# Patient Record
Sex: FEMALE | Race: ASIAN | Employment: UNEMPLOYED | URBAN - METROPOLITAN AREA
[De-identification: names, ages, dates, MRNs, and addresses within clinical notes are randomized per-mention and may not be internally consistent; named-entity substitution may affect disease eponyms.]

---

## 2017-03-08 PROBLEM — O34.219 HISTORY OF CESAREAN DELIVERY, CURRENTLY PREGNANT: Status: ACTIVE | Noted: 2017-03-08

## 2017-03-08 PROBLEM — Z34.80 ENCOUNTER FOR SUPERVISION OF OTHER NORMAL PREGNANCY: Status: ACTIVE | Noted: 2017-03-08

## 2017-03-08 PROBLEM — O34.219 HISTORY OF CESAREAN DELIVERY, CURRENTLY PREGNANT (HCC): Status: ACTIVE | Noted: 2017-03-08

## 2017-03-08 PROCEDURE — 87491 CHLMYD TRACH DNA AMP PROBE: CPT | Performed by: OBSTETRICS & GYNECOLOGY

## 2017-03-08 PROCEDURE — 87591 N.GONORRHOEAE DNA AMP PROB: CPT | Performed by: OBSTETRICS & GYNECOLOGY

## 2017-03-08 PROCEDURE — 88175 CYTOPATH C/V AUTO FLUID REDO: CPT | Performed by: OBSTETRICS & GYNECOLOGY

## 2017-04-03 PROCEDURE — 86850 RBC ANTIBODY SCREEN: CPT | Performed by: OBSTETRICS & GYNECOLOGY

## 2017-04-03 PROCEDURE — 36415 COLL VENOUS BLD VENIPUNCTURE: CPT | Performed by: OBSTETRICS & GYNECOLOGY

## 2017-04-03 PROCEDURE — 86901 BLOOD TYPING SEROLOGIC RH(D): CPT | Performed by: OBSTETRICS & GYNECOLOGY

## 2017-04-03 PROCEDURE — 85025 COMPLETE CBC W/AUTO DIFF WBC: CPT | Performed by: OBSTETRICS & GYNECOLOGY

## 2017-04-03 PROCEDURE — 86900 BLOOD TYPING SEROLOGIC ABO: CPT | Performed by: OBSTETRICS & GYNECOLOGY

## 2017-04-03 PROCEDURE — 86762 RUBELLA ANTIBODY: CPT | Performed by: OBSTETRICS & GYNECOLOGY

## 2017-04-03 PROCEDURE — 87389 HIV-1 AG W/HIV-1&-2 AB AG IA: CPT | Performed by: OBSTETRICS & GYNECOLOGY

## 2017-04-03 PROCEDURE — 87340 HEPATITIS B SURFACE AG IA: CPT | Performed by: OBSTETRICS & GYNECOLOGY

## 2017-04-03 PROCEDURE — 87086 URINE CULTURE/COLONY COUNT: CPT | Performed by: OBSTETRICS & GYNECOLOGY

## 2017-04-03 PROCEDURE — 86780 TREPONEMA PALLIDUM: CPT | Performed by: OBSTETRICS & GYNECOLOGY

## 2017-04-13 PROCEDURE — 36415 COLL VENOUS BLD VENIPUNCTURE: CPT | Performed by: OBSTETRICS & GYNECOLOGY

## 2017-04-13 PROCEDURE — 81511 FTL CGEN ABNOR FOUR ANAL: CPT | Performed by: OBSTETRICS & GYNECOLOGY

## 2017-06-01 ENCOUNTER — OFFICE VISIT (OUTPATIENT)
Dept: PERINATAL CARE | Facility: HOSPITAL | Age: 22
End: 2017-06-01
Attending: OBSTETRICS & GYNECOLOGY
Payer: MEDICAID

## 2017-06-01 VITALS
DIASTOLIC BLOOD PRESSURE: 62 MMHG | BODY MASS INDEX: 26 KG/M2 | HEART RATE: 115 BPM | SYSTOLIC BLOOD PRESSURE: 136 MMHG | WEIGHT: 145 LBS

## 2017-06-01 DIAGNOSIS — Z03.73 SUSPECTED FETAL ANOMALY NOT FOUND: Primary | ICD-10-CM

## 2017-06-01 PROCEDURE — 99242 OFF/OP CONSLTJ NEW/EST SF 20: CPT

## 2017-06-01 NOTE — PROGRESS NOTES
Outpatient Maternal-Fetal Medicine Consultation    Dear Dr. Kellen Kim    Thank you for requesting ultrasound evaluation and maternal fetal medicine consultation on your patient [facility].   As you are aware she is a 24year old female  with a [place] on: 06/01/2017 EDC: 09/24/2017 GA by current scan: 23w4d  The calculation of the gestational age by current scan was based on BPD, OFD, HC, TCD, AC, FL and HUM.  ____________________________________________________________________________  Anatomy Scan:  S was notably reassuring. The fetal growth was also appropriate for gestational age. SUSPECT SMALL FOR GESTATIONAL AGE  An outside ultrasound the overall growth is at the 6th percentile.   Today the overall growth is appropriate for gestational age at the

## 2017-06-21 PROCEDURE — 86780 TREPONEMA PALLIDUM: CPT | Performed by: OBSTETRICS & GYNECOLOGY

## 2017-06-21 PROCEDURE — 82950 GLUCOSE TEST: CPT | Performed by: OBSTETRICS & GYNECOLOGY

## 2017-07-12 ENCOUNTER — OFFICE VISIT (OUTPATIENT)
Dept: PHYSICAL THERAPY | Age: 22
End: 2017-07-12
Attending: OBSTETRICS & GYNECOLOGY
Payer: MEDICAID

## 2017-07-12 DIAGNOSIS — O26.893 BACK PAIN AFFECTING PREGNANCY, THIRD TRIMESTER: ICD-10-CM

## 2017-07-12 PROCEDURE — 97110 THERAPEUTIC EXERCISES: CPT

## 2017-07-12 PROCEDURE — 97161 PT EVAL LOW COMPLEX 20 MIN: CPT

## 2017-07-12 NOTE — PROGRESS NOTES
SPINE EVALUATION:   Referring Physician: Dr. Nedra Aceves  Diagnosis:  Lumbar spine pain due to pregnancy.  She is due  9/17/17 Date of Service: 7/12/2017     PATIENT Gerry Cotton is a 24year old y/o female who presents to therapy today with complaints mod  Gastroc-soleus: R mod; L mod     Special tests: NT    Balance: SLS R NT, L NT    Today’s Treatment and Response:  Patient education provided on pt issued HEP for quadraped cat and camel and prayer stretch.  STM to  B piriformis and low back today with you have any questions, please contact me at Dept: [phone]    Sincerely,  Electronically signed by therapist: César Lopez PT    Physician's certification required: Yes  I certify the need for these services furnished under this plan of treatment

## 2017-07-18 ENCOUNTER — APPOINTMENT (OUTPATIENT)
Dept: PHYSICAL THERAPY | Age: 22
End: 2017-07-18
Payer: MEDICAID

## 2017-07-20 ENCOUNTER — APPOINTMENT (OUTPATIENT)
Dept: PHYSICAL THERAPY | Age: 22
End: 2017-07-20
Payer: MEDICAID

## 2017-08-13 ENCOUNTER — HOSPITAL ENCOUNTER (OUTPATIENT)
Facility: HOSPITAL | Age: 22
Setting detail: OBSERVATION
Discharge: HOME | End: 2017-08-13
Attending: OBSTETRICS & GYNECOLOGY | Admitting: OBSTETRICS & GYNECOLOGY
Payer: MEDICAID

## 2017-08-13 VITALS
SYSTOLIC BLOOD PRESSURE: 127 MMHG | RESPIRATION RATE: 18 BRPM | TEMPERATURE: 98 F | HEIGHT: 63 IN | HEART RATE: 105 BPM | WEIGHT: 160 LBS | DIASTOLIC BLOOD PRESSURE: 74 MMHG | BODY MASS INDEX: 28.35 KG/M2

## 2017-08-13 PROBLEM — Z34.90 PREGNANCY (HCC): Status: ACTIVE | Noted: 2017-08-13

## 2017-08-13 PROBLEM — Z34.90 PREGNANCY: Status: ACTIVE | Noted: 2017-08-13

## 2017-08-13 PROCEDURE — 59025 FETAL NON-STRESS TEST: CPT

## 2017-08-13 PROCEDURE — 84112 EVAL AMNIOTIC FLUID PROTEIN: CPT

## 2017-08-13 NOTE — NST
Nonstress Test   Patient: Abisai Leggett    Gestation: 35w0d    NST:       Variability: Moderate           Accelerations: Yes           Decelerations: None            Baseline: 135 BPM           Uterine Irritability: Yes           Contractions: Irregular

## 2017-08-13 NOTE — PROGRESS NOTES
Pt is a 24year old female admitted to TRG3/TRG3-A. Patient presents with:  R/o Rom   Pt states she had \"one gush of clear fluid yesterday at 1700\". Denies any further leaking, vaginal bleeding.  C/O \"irregular contractions\" that she rates as a 10, b

## 2017-08-21 PROCEDURE — 87081 CULTURE SCREEN ONLY: CPT | Performed by: OBSTETRICS & GYNECOLOGY

## 2017-08-21 PROCEDURE — 87653 STREP B DNA AMP PROBE: CPT | Performed by: OBSTETRICS & GYNECOLOGY

## 2017-09-07 ENCOUNTER — HOSPITAL ENCOUNTER (OUTPATIENT)
Facility: HOSPITAL | Age: 22
Setting detail: OBSERVATION
Discharge: HOME | End: 2017-09-07
Attending: OBSTETRICS & GYNECOLOGY | Admitting: OBSTETRICS & GYNECOLOGY

## 2017-09-07 VITALS — HEIGHT: 63 IN | TEMPERATURE: 98 F | BODY MASS INDEX: 28.35 KG/M2 | WEIGHT: 160 LBS | RESPIRATION RATE: 18 BRPM

## 2017-09-07 LAB
BILIRUBIN URINE: NEGATIVE
BLOOD URINE: NEGATIVE
CONTROL RUN WITHIN 24 HOURS?: YES
GLUCOSE URINE: NEGATIVE
KETONE URINE: NEGATIVE
NITRITE URINE: NEGATIVE
PH URINE: 7 (ref 5–8)
PROTEIN URINE: NEGATIVE
SPEC GRAVITY: 1.02 (ref 1–1.03)
URINE CLARITY: CLEAR
URINE COLOR: YELLOW
UROBILINOGEN URINE: 0.2

## 2017-09-07 PROCEDURE — 59025 FETAL NON-STRESS TEST: CPT

## 2017-09-07 PROCEDURE — 81002 URINALYSIS NONAUTO W/O SCOPE: CPT

## 2017-09-07 NOTE — PROGRESS NOTES
Pt is a 24year old female admitted to [facility]. Patient presents with:  R/o Labor: started piedad last night and then resumed this am     Pt is  38w4d intra-uterine pregnancy. History obtained, consents signed.  Oriented to room, staff, a

## 2017-09-07 NOTE — NST
Nonstress Test   Patient: Abisai Leggett    Gestation: 38w4d    NST:       Variability: Moderate           Accelerations: Yes           Decelerations: None            Baseline: 140 BPM           Uterine Irritability: No           Contractions: Irregular

## 2017-09-15 ENCOUNTER — TELEPHONE (OUTPATIENT)
Dept: OBGYN UNIT | Facility: HOSPITAL | Age: 22
End: 2017-09-15

## 2017-09-19 ENCOUNTER — TELEPHONE (OUTPATIENT)
Dept: OBGYN UNIT | Facility: HOSPITAL | Age: 22
End: 2017-09-19

## 2017-09-20 ENCOUNTER — TELEPHONE (OUTPATIENT)
Dept: OBGYN UNIT | Facility: HOSPITAL | Age: 22
End: 2017-09-20

## 2017-09-22 ENCOUNTER — ANESTHESIA EVENT (OUTPATIENT)
Dept: OBGYN UNIT | Facility: HOSPITAL | Age: 22
End: 2017-09-22

## 2017-09-22 ENCOUNTER — HOSPITAL ENCOUNTER (INPATIENT)
Facility: HOSPITAL | Age: 22
LOS: 3 days | Discharge: HOME | End: 2017-09-25
Attending: OBSTETRICS & GYNECOLOGY | Admitting: OBSTETRICS & GYNECOLOGY

## 2017-09-22 ENCOUNTER — SURGERY (OUTPATIENT)
Age: 22
End: 2017-09-22

## 2017-09-22 ENCOUNTER — ANESTHESIA (OUTPATIENT)
Dept: OBGYN UNIT | Facility: HOSPITAL | Age: 22
End: 2017-09-22

## 2017-09-22 PROBLEM — Z98.891 STATUS POST CESAREAN DELIVERY: Status: ACTIVE | Noted: 2017-09-22

## 2017-09-22 LAB
ANTIBODY SCREEN: NEGATIVE
BASOPHILS # BLD AUTO: 0.04 X10(3) UL (ref 0–0.1)
BASOPHILS NFR BLD AUTO: 0.4 %
CONTROL RUN WITHIN 24 HOURS?: YES
EOSINOPHIL # BLD AUTO: 0.13 X10(3) UL (ref 0–0.3)
EOSINOPHIL NFR BLD AUTO: 1.3 %
ERYTHROCYTE [DISTWIDTH] IN BLOOD BY AUTOMATED COUNT: 13.3 % (ref 11.5–16)
GLUCOSE URINE: NEGATIVE
HCT VFR BLD AUTO: 41.1 % (ref 34–50)
HGB BLD-MCNC: 13.6 G/DL (ref 12–16)
IMMATURE GRANULOCYTE COUNT: 0.1 X10(3) UL (ref 0–1)
IMMATURE GRANULOCYTE RATIO %: 1 %
KETONE URINE: 80
LEUKOCYTE ESTERASE URINE: NEGATIVE
LYMPHOCYTES # BLD AUTO: 3.53 X10(3) UL (ref 0.9–4)
LYMPHOCYTES NFR BLD AUTO: 35.1 %
MCH RBC QN AUTO: 28.8 PG (ref 27–33.2)
MCHC RBC AUTO-ENTMCNC: 33.1 G/DL (ref 31–37)
MCV RBC AUTO: 87.1 FL (ref 81–100)
MONOCYTES # BLD AUTO: 0.62 X10(3) UL (ref 0.1–0.6)
MONOCYTES NFR BLD AUTO: 6.2 %
NEUTROPHIL ABS PRELIM: 5.63 X10 (3) UL (ref 1.3–6.7)
NEUTROPHILS # BLD AUTO: 5.63 X10(3) UL (ref 1.3–6.7)
NEUTROPHILS NFR BLD AUTO: 56 %
NITRITE URINE: NEGATIVE
PH URINE: 6.5 (ref 5–8)
PLATELET # BLD AUTO: 221 10(3)UL (ref 150–450)
PROTEIN URINE: 30
RBC # BLD AUTO: 4.72 X10(6)UL (ref 3.8–5.1)
RED CELL DISTRIBUTION WIDTH-SD: 41.8 FL (ref 35.1–46.3)
RH BLOOD TYPE: POSITIVE
SPEC GRAVITY: >=1.03 (ref 1–1.03)
T PALLIDUM AB SER QL IA: NONREACTIVE
URINE CLARITY: CLEAR
UROBILINOGEN URINE: 0.2
WBC # BLD AUTO: 10.1 X10(3) UL (ref 4–13)

## 2017-09-22 PROCEDURE — 81002 URINALYSIS NONAUTO W/O SCOPE: CPT

## 2017-09-22 PROCEDURE — 86780 TREPONEMA PALLIDUM: CPT | Performed by: OBSTETRICS & GYNECOLOGY

## 2017-09-22 PROCEDURE — 86900 BLOOD TYPING SEROLOGIC ABO: CPT | Performed by: OBSTETRICS & GYNECOLOGY

## 2017-09-22 PROCEDURE — 86901 BLOOD TYPING SEROLOGIC RH(D): CPT | Performed by: OBSTETRICS & GYNECOLOGY

## 2017-09-22 PROCEDURE — 85025 COMPLETE CBC W/AUTO DIFF WBC: CPT | Performed by: OBSTETRICS & GYNECOLOGY

## 2017-09-22 PROCEDURE — 86850 RBC ANTIBODY SCREEN: CPT | Performed by: OBSTETRICS & GYNECOLOGY

## 2017-09-22 RX ORDER — HYDROMORPHONE HYDROCHLORIDE 1 MG/ML
0.4 INJECTION, SOLUTION INTRAMUSCULAR; INTRAVENOUS; SUBCUTANEOUS EVERY 2 HOUR PRN
Status: ACTIVE | OUTPATIENT
Start: 2017-09-22 | End: 2017-09-23

## 2017-09-22 RX ORDER — KETOROLAC TROMETHAMINE 30 MG/ML
30 INJECTION, SOLUTION INTRAMUSCULAR; INTRAVENOUS ONCE AS NEEDED
Status: COMPLETED | OUTPATIENT
Start: 2017-09-22 | End: 2017-09-22

## 2017-09-22 RX ORDER — DIPHENHYDRAMINE HYDROCHLORIDE 50 MG/ML
12.5 INJECTION INTRAMUSCULAR; INTRAVENOUS EVERY 4 HOURS PRN
Status: DISCONTINUED | OUTPATIENT
Start: 2017-09-22 | End: 2017-09-25

## 2017-09-22 RX ORDER — KETOROLAC TROMETHAMINE 30 MG/ML
30 INJECTION, SOLUTION INTRAMUSCULAR; INTRAVENOUS EVERY 6 HOURS PRN
Status: DISPENSED | OUTPATIENT
Start: 2017-09-22 | End: 2017-09-23

## 2017-09-22 RX ORDER — DIPHENHYDRAMINE HYDROCHLORIDE 50 MG/ML
25 INJECTION INTRAMUSCULAR; INTRAVENOUS ONCE AS NEEDED
Status: DISCONTINUED | OUTPATIENT
Start: 2017-09-22 | End: 2017-09-22

## 2017-09-22 RX ORDER — KETOROLAC TROMETHAMINE 30 MG/ML
INJECTION, SOLUTION INTRAMUSCULAR; INTRAVENOUS
Status: DISPENSED
Start: 2017-09-22 | End: 2017-09-22

## 2017-09-22 RX ORDER — METOCLOPRAMIDE HYDROCHLORIDE 5 MG/ML
10 INJECTION INTRAMUSCULAR; INTRAVENOUS EVERY 8 HOURS PRN
Status: DISCONTINUED | OUTPATIENT
Start: 2017-09-22 | End: 2017-09-25

## 2017-09-22 RX ORDER — MORPHINE SULFATE 0.5 MG/ML
0.2 INJECTION, SOLUTION EPIDURAL; INTRATHECAL; INTRAVENOUS ONCE
Status: DISCONTINUED | OUTPATIENT
Start: 2017-09-22 | End: 2017-09-25

## 2017-09-22 RX ORDER — ONDANSETRON 2 MG/ML
4 INJECTION INTRAMUSCULAR; INTRAVENOUS ONCE AS NEEDED
Status: DISCONTINUED | OUTPATIENT
Start: 2017-09-22 | End: 2017-09-22

## 2017-09-22 RX ORDER — HYDROCODONE BITARTRATE AND ACETAMINOPHEN 5; 325 MG/1; MG/1
1 TABLET ORAL EVERY 4 HOURS PRN
Status: DISCONTINUED | OUTPATIENT
Start: 2017-09-22 | End: 2017-09-25

## 2017-09-22 RX ORDER — ZOLPIDEM TARTRATE 5 MG/1
5 TABLET ORAL NIGHTLY PRN
Status: DISCONTINUED | OUTPATIENT
Start: 2017-09-22 | End: 2017-09-25

## 2017-09-22 RX ORDER — MORPHINE SULFATE 4 MG/ML
2 INJECTION, SOLUTION INTRAMUSCULAR; INTRAVENOUS EVERY 2 HOUR PRN
Status: ACTIVE | OUTPATIENT
Start: 2017-09-22 | End: 2017-09-23

## 2017-09-22 RX ORDER — SIMETHICONE 80 MG
80 TABLET,CHEWABLE ORAL 3 TIMES DAILY PRN
Status: DISCONTINUED | OUTPATIENT
Start: 2017-09-22 | End: 2017-09-25

## 2017-09-22 RX ORDER — DEXTROSE, SODIUM CHLORIDE, SODIUM LACTATE, POTASSIUM CHLORIDE, AND CALCIUM CHLORIDE 5; .6; .31; .03; .02 G/100ML; G/100ML; G/100ML; G/100ML; G/100ML
INJECTION, SOLUTION INTRAVENOUS CONTINUOUS
Status: DISCONTINUED | OUTPATIENT
Start: 2017-09-22 | End: 2017-09-25

## 2017-09-22 RX ORDER — IBUPROFEN 600 MG/1
600 TABLET ORAL EVERY 6 HOURS SCHEDULED
Status: DISCONTINUED | OUTPATIENT
Start: 2017-09-23 | End: 2017-09-25

## 2017-09-22 RX ORDER — METOCLOPRAMIDE HYDROCHLORIDE 5 MG/ML
INJECTION INTRAMUSCULAR; INTRAVENOUS
Status: COMPLETED
Start: 2017-09-22 | End: 2017-09-22

## 2017-09-22 RX ORDER — DOCUSATE SODIUM 100 MG/1
100 CAPSULE, LIQUID FILLED ORAL
Status: DISCONTINUED | OUTPATIENT
Start: 2017-09-23 | End: 2017-09-25

## 2017-09-22 RX ORDER — HYDROCODONE BITARTRATE AND ACETAMINOPHEN 10; 325 MG/1; MG/1
1 TABLET ORAL EVERY 4 HOURS PRN
Status: DISCONTINUED | OUTPATIENT
Start: 2017-09-22 | End: 2017-09-25

## 2017-09-22 RX ORDER — TRISODIUM CITRATE DIHYDRATE AND CITRIC ACID MONOHYDRATE 500; 334 MG/5ML; MG/5ML
30 SOLUTION ORAL ONCE
Status: COMPLETED | OUTPATIENT
Start: 2017-09-22 | End: 2017-09-22

## 2017-09-22 RX ORDER — SODIUM CHLORIDE, SODIUM LACTATE, POTASSIUM CHLORIDE, CALCIUM CHLORIDE 600; 310; 30; 20 MG/100ML; MG/100ML; MG/100ML; MG/100ML
INJECTION, SOLUTION INTRAVENOUS CONTINUOUS
Status: DISCONTINUED | OUTPATIENT
Start: 2017-09-22 | End: 2017-09-22

## 2017-09-22 RX ORDER — HYDROMORPHONE HYDROCHLORIDE 1 MG/ML
0.4 INJECTION, SOLUTION INTRAMUSCULAR; INTRAVENOUS; SUBCUTANEOUS EVERY 5 MIN PRN
Status: DISCONTINUED | OUTPATIENT
Start: 2017-09-22 | End: 2017-09-22 | Stop reason: HOSPADM

## 2017-09-22 RX ORDER — NALBUPHINE HCL 10 MG/ML
2.5 AMPUL (ML) INJECTION
Status: DISCONTINUED | OUTPATIENT
Start: 2017-09-22 | End: 2017-09-22 | Stop reason: HOSPADM

## 2017-09-22 RX ORDER — NALOXONE HYDROCHLORIDE 0.4 MG/ML
0.08 INJECTION, SOLUTION INTRAMUSCULAR; INTRAVENOUS; SUBCUTANEOUS
Status: ACTIVE | OUTPATIENT
Start: 2017-09-22 | End: 2017-09-23

## 2017-09-22 RX ORDER — NALBUPHINE HCL 10 MG/ML
2.5 AMPUL (ML) INJECTION EVERY 4 HOURS PRN
Status: DISCONTINUED | OUTPATIENT
Start: 2017-09-22 | End: 2017-09-25

## 2017-09-22 RX ORDER — BISACODYL 10 MG
10 SUPPOSITORY, RECTAL RECTAL
Status: DISCONTINUED | OUTPATIENT
Start: 2017-09-22 | End: 2017-09-25

## 2017-09-22 RX ORDER — DIPHENHYDRAMINE HCL 25 MG
25 CAPSULE ORAL EVERY 4 HOURS PRN
Status: DISCONTINUED | OUTPATIENT
Start: 2017-09-22 | End: 2017-09-25

## 2017-09-22 RX ORDER — ONDANSETRON 2 MG/ML
4 INJECTION INTRAMUSCULAR; INTRAVENOUS EVERY 6 HOURS PRN
Status: DISCONTINUED | OUTPATIENT
Start: 2017-09-22 | End: 2017-09-25

## 2017-09-22 NOTE — ANESTHESIA PREPROCEDURE EVALUATION
PRE-OP EVALUATION    Patient Name: Ferrell Collet    Pre-op Diagnosis: 40 5/7 weeks previous  section    Procedure(s):      Surgeon(s) and Role:     Leandra Lopes MD - Primary    Pre-op vitals reviewed.   Temp: 98.4 °F (36.9 °C)  Pulse: 111  Resp: 16  BP 09/22/2017   MCH 28.8 09/22/2017   MCHC 33.1 09/22/2017   RDW 13.3 09/22/2017   .0 09/22/2017               Airway      Mallampati: II  Mouth opening: 3 FB  TM distance: 4 - 6 cm  Neck ROM: full Cardiovascular    Cardiovascular exam normal.

## 2017-09-22 NOTE — ANESTHESIA POSTPROCEDURE EVALUATION
[id number] Patient Status:  Inpatient   Age/Gender 24year old female MRN [id number]   Location [facility] Attending Mauro Tijerina MD   Hosp Day # 0 PCP Arnaldo Lerma MD       Anesthesia Post-op Note    Procedure(s):

## 2017-09-22 NOTE — PROGRESS NOTES
Pt turned from side to side for benton and kohler cath care,pad changed. Iv patent and kohler draining clear yellow urine. To Nicu to see infant then mother and baby.

## 2017-09-22 NOTE — PROGRESS NOTES
NURSING ADMISSION NOTE    Patient admitted via card. Oriented to room. Safety precautions initiated. Bed in low position. Call light in reach. NPO/ ice chips provide. Baby in NICU. IVF with D 5 LR @ 125 cc/ hr / pump.  Brandt in place to gravity

## 2017-09-22 NOTE — H&P
[facility]  History & Physical    SUBJECTIVE:    Shelley Block is a 24year old  at 39w6d who presents for repeat CD.     Obstetric History:    OB History    Para Term  AB Living   2 1 1 0 0 1   SAB TAB Ectopic Multiple Live Births

## 2017-09-22 NOTE — CM/SW NOTE
CM reviewed patient chart and insurance. Change [facility] called and ask to meet with patient since patient appears to be self pay.

## 2017-09-23 LAB
BASOPHILS # BLD AUTO: 0.02 X10(3) UL (ref 0–0.1)
BASOPHILS NFR BLD AUTO: 0.2 %
EOSINOPHIL # BLD AUTO: 0.08 X10(3) UL (ref 0–0.3)
EOSINOPHIL NFR BLD AUTO: 0.7 %
ERYTHROCYTE [DISTWIDTH] IN BLOOD BY AUTOMATED COUNT: 13.5 % (ref 11.5–16)
HCT VFR BLD AUTO: 38.2 % (ref 34–50)
HGB BLD-MCNC: 12.7 G/DL (ref 12–16)
IMMATURE GRANULOCYTE COUNT: 0.05 X10(3) UL (ref 0–1)
IMMATURE GRANULOCYTE RATIO %: 0.4 %
LYMPHOCYTES # BLD AUTO: 2.82 X10(3) UL (ref 0.9–4)
LYMPHOCYTES NFR BLD AUTO: 23.8 %
MCH RBC QN AUTO: 29.3 PG (ref 27–33.2)
MCHC RBC AUTO-ENTMCNC: 33.2 G/DL (ref 31–37)
MCV RBC AUTO: 88 FL (ref 81–100)
MONOCYTES # BLD AUTO: 1 X10(3) UL (ref 0.1–0.6)
MONOCYTES NFR BLD AUTO: 8.4 %
NEUTROPHIL ABS PRELIM: 7.87 X10 (3) UL (ref 1.3–6.7)
NEUTROPHILS # BLD AUTO: 7.87 X10(3) UL (ref 1.3–6.7)
NEUTROPHILS NFR BLD AUTO: 66.5 %
PLATELET # BLD AUTO: 195 10(3)UL (ref 150–450)
RBC # BLD AUTO: 4.34 X10(6)UL (ref 3.8–5.1)
RED CELL DISTRIBUTION WIDTH-SD: 43 FL (ref 35.1–46.3)
WBC # BLD AUTO: 11.8 X10(3) UL (ref 4–13)

## 2017-09-23 PROCEDURE — 85025 COMPLETE CBC W/AUTO DIFF WBC: CPT | Performed by: OBSTETRICS & GYNECOLOGY

## 2017-09-23 NOTE — PROGRESS NOTES
[facility]    Patients Name: [facility]  Attending Physician: Jt Jean MD  CSN: [id number]    Location:  2219/2219-A  MRN: [id number]    YOB: 1995  Admission Date: 9/22/2017     Obstetric Anesthesia Pain Progress Note    Post-Op Day 1:

## 2017-09-23 NOTE — BH PROGRESS NOTE
LUNA PPD SCREENING    Reason for Referral: Pt scored 11 on the EPDS. Current Stressors:    History of PPD: Pt states this is her second child and she does not recall any symptoms of depression or anxiety after the birth of her first child.    Financial: P number

## 2017-09-23 NOTE — PROGRESS NOTES
Patient complaints: none    Vital signs reviewed    Examination:  General: alert, appropriate affect  Abdomen: Fundus firm and no unexpected tenderness.   Remainder of abdomen unremarkable  Incision: dry, intact, no unexpected findings  Lochia: appropriate

## 2017-09-23 NOTE — PLAN OF CARE
ANXIETY    • Will report anxiety at manageable levels Progressing        BIRTH - VAGINAL/ SECTION    • Fetal and maternal status remain reassuring during the birth process Progressing        GENITOURINARY - ADULT    • Absence of urinary retention P

## 2017-09-25 VITALS
DIASTOLIC BLOOD PRESSURE: 73 MMHG | TEMPERATURE: 98 F | WEIGHT: 162 LBS | RESPIRATION RATE: 18 BRPM | HEART RATE: 72 BPM | BODY MASS INDEX: 28.7 KG/M2 | OXYGEN SATURATION: 99 % | SYSTOLIC BLOOD PRESSURE: 86 MMHG | HEIGHT: 63 IN

## 2017-09-25 RX ORDER — HYDROCODONE BITARTRATE AND ACETAMINOPHEN 5; 325 MG/1; MG/1
1 TABLET ORAL EVERY 4 HOURS PRN
Qty: 20 TABLET | Refills: 0 | Status: SHIPPED | OUTPATIENT
Start: 2017-09-25 | End: 2019-03-25

## 2017-09-25 NOTE — PROGRESS NOTES
Postop Day 3    Pt without complaints.      Temp:  [98 °F (36.7 °C)-98.1 °F (36.7 °C)] 98.1 °F (36.7 °C)  Pulse:  [72-79] 72  Resp:  [17-18] 18  BP: ()/(73-81) 86/73  abd  soft, NT, ND, fundus firm below umbilicus, incision C/D/I  extr  trace edema, n

## 2017-09-25 NOTE — DISCHARGE SUMMARY
Date of admission:  2017  7:26 AM  Date of discharge:  2017  Admit diagnosis:  40w5d      Previous  section  Discharge diagnosis: Same     Status post  section  Hospital course:     Cata Ibrahim is a 24year old  at 39w6d, wh

## 2017-09-28 ENCOUNTER — TELEPHONE (OUTPATIENT)
Dept: OBGYN UNIT | Facility: HOSPITAL | Age: 22
End: 2017-09-28

## 2017-09-30 ENCOUNTER — TELEPHONE (OUTPATIENT)
Dept: OBGYN UNIT | Facility: HOSPITAL | Age: 22
End: 2017-09-30

## 2017-10-06 PROBLEM — O34.219 HISTORY OF CESAREAN DELIVERY, CURRENTLY PREGNANT: Status: RESOLVED | Noted: 2017-03-08 | Resolved: 2017-10-06

## 2017-10-06 PROBLEM — O34.219 HISTORY OF CESAREAN DELIVERY, CURRENTLY PREGNANT (HCC): Status: RESOLVED | Noted: 2017-03-08 | Resolved: 2017-10-06

## 2017-10-06 PROBLEM — Z34.80 ENCOUNTER FOR SUPERVISION OF OTHER NORMAL PREGNANCY: Status: RESOLVED | Noted: 2017-03-08 | Resolved: 2017-10-06

## 2019-02-24 NOTE — OPERATIVE REPORT
Operative Note    Preop diagnosis: 40w5d     Previous  section  Postop diagnosis: Same  Procedure:  Repeat low transverse  section  Surgeon:  Cristiane Rose  Assistant:  Noe Almanza  Anesthesia:  Spinal   Findings:  Female infant weighing 9#5 with Apg clots and debris. The uterine incision was closed with #1 chromic in a running-locking fashion. A second layer was imbricated using #1 chromic. An additional figure of eight suture using 2-0 chromic was placed for hemostasis.  Good hemostasis was confirmed 15

## 2019-03-25 ENCOUNTER — OFFICE VISIT (OUTPATIENT)
Dept: FAMILY MEDICINE CLINIC | Facility: CLINIC | Age: 24
End: 2019-03-25

## 2019-03-25 VITALS
OXYGEN SATURATION: 99 % | TEMPERATURE: 98 F | DIASTOLIC BLOOD PRESSURE: 80 MMHG | SYSTOLIC BLOOD PRESSURE: 118 MMHG | RESPIRATION RATE: 16 BRPM | WEIGHT: 141 LBS | BODY MASS INDEX: 25.62 KG/M2 | HEIGHT: 62.25 IN | HEART RATE: 72 BPM

## 2019-03-25 DIAGNOSIS — Z00.00 LABORATORY EXAM ORDERED AS PART OF ROUTINE GENERAL MEDICAL EXAMINATION: ICD-10-CM

## 2019-03-25 DIAGNOSIS — Z00.00 WELL ADULT EXAM: Primary | ICD-10-CM

## 2019-03-25 DIAGNOSIS — L30.9 DERMATITIS: ICD-10-CM

## 2019-03-25 DIAGNOSIS — R22.32 MASS OF LEFT WRIST: ICD-10-CM

## 2019-03-25 DIAGNOSIS — R07.9 CHEST PAIN, UNSPECIFIED TYPE: ICD-10-CM

## 2019-03-25 DIAGNOSIS — Z71.82 EXERCISE COUNSELING: ICD-10-CM

## 2019-03-25 DIAGNOSIS — Z13.220 SCREENING CHOLESTEROL LEVEL: ICD-10-CM

## 2019-03-25 DIAGNOSIS — Z13.31 DEPRESSION SCREENING: ICD-10-CM

## 2019-03-25 DIAGNOSIS — Z76.89 ENCOUNTER TO ESTABLISH CARE: ICD-10-CM

## 2019-03-25 PROCEDURE — 99385 PREV VISIT NEW AGE 18-39: CPT | Performed by: FAMILY MEDICINE

## 2019-03-25 PROCEDURE — 93000 ELECTROCARDIOGRAM COMPLETE: CPT | Performed by: FAMILY MEDICINE

## 2019-03-25 NOTE — PROGRESS NOTES
Patient presents with:  Bump: on L wrist for over 3 months and is growing in size  Chest Pressure: for about a month and was taking tylenol to help with the pressure and slight \"pinching pain\" - some relief but this past week she states the pressure is t Negative for  palpitations and leg swelling. Gastrointestinal: Negative for nausea, vomiting, abdominal pain, diarrhea, blood in stool   Genitourinary: Negative for dysuria, hematuria and difficulty urinating.    Musculoskeletal: Negative for myalgias, ba normal. Non tender, no masses, no organomegaly   Musculoskeletal: Normal range of motion. No edema and no tenderness. Left wrist with ganglion cyst on inner aspect radial side about 1.5 cm   Lymphadenopathy: No cervical adenopathy.    Neurological: Normal left arm. Do not use for more than 7-10 days in a row. Dispense: 20 g; Refill: 0    3. Chest pain, unspecified type  - suspect costochondritis but will need r/o cardiac cause. DDImer due to recent hx of travel to [place]. - D-DIMER;  Future  - ELECTROC

## 2019-03-26 ENCOUNTER — TELEPHONE (OUTPATIENT)
Dept: FAMILY MEDICINE CLINIC | Facility: CLINIC | Age: 24
End: 2019-03-26

## 2019-03-26 ENCOUNTER — HOSPITAL ENCOUNTER (OUTPATIENT)
Dept: GENERAL RADIOLOGY | Facility: HOSPITAL | Age: 24
Discharge: HOME | End: 2019-03-26
Attending: FAMILY MEDICINE
Payer: COMMERCIAL

## 2019-03-26 ENCOUNTER — HOSPITAL ENCOUNTER (OUTPATIENT)
Dept: ULTRASOUND IMAGING | Facility: HOSPITAL | Age: 24
Discharge: HOME | End: 2019-03-26
Attending: FAMILY MEDICINE
Payer: COMMERCIAL

## 2019-03-26 DIAGNOSIS — M67.432 GANGLION CYST OF WRIST, LEFT: Primary | ICD-10-CM

## 2019-03-26 DIAGNOSIS — R07.9 CHEST PAIN, UNSPECIFIED TYPE: ICD-10-CM

## 2019-03-26 DIAGNOSIS — R22.32 MASS OF LEFT WRIST: ICD-10-CM

## 2019-03-26 DIAGNOSIS — R07.1 CHEST PAIN ON BREATHING: Primary | ICD-10-CM

## 2019-03-26 PROBLEM — L30.9 DERMATITIS: Status: ACTIVE | Noted: 2019-03-26

## 2019-03-26 PROCEDURE — 76882 US LMTD JT/FCL EVL NVASC XTR: CPT | Performed by: FAMILY MEDICINE

## 2019-03-26 PROCEDURE — 71046 X-RAY EXAM CHEST 2 VIEWS: CPT | Performed by: FAMILY MEDICINE

## 2019-03-26 RX ORDER — NAPROXEN 500 MG/1
500 TABLET ORAL 2 TIMES DAILY WITH MEALS
Qty: 30 TABLET | Refills: 0 | Status: SHIPPED | OUTPATIENT
Start: 2019-03-26 | End: 2019-06-07

## 2019-03-26 NOTE — TELEPHONE ENCOUNTER
Patients  was informed of MD recommendations, patients  verbalized understanding with intent to comply.      Future Appointments   Date Time Provider Tania Tan   [facility]2019  [facility]   4/9/2019  2:40 PM

## 2019-03-26 NOTE — TELEPHONE ENCOUNTER
I have ordered a CT chest due to persistent chest pain. CXR and EKG are normal. Please have her take naproxen in the meantime.

## 2019-03-26 NOTE — TELEPHONE ENCOUNTER
Patients  would like to know why patient is experiencing so much chest pain. States that patient was crying due to the pain. Has tried OTC tylenol and Advil and nothing has worked. Pt would like to know if a pain medication would be appopriate.  Iker Daniel

## 2019-03-29 ENCOUNTER — TELEPHONE (OUTPATIENT)
Dept: FAMILY MEDICINE CLINIC | Facility: CLINIC | Age: 24
End: 2019-03-29

## 2019-03-29 DIAGNOSIS — R07.1 CHEST PAIN ON BREATHING: Primary | ICD-10-CM

## 2019-03-29 NOTE — TELEPHONE ENCOUNTER
CT CHEST (W+WO) (CPT=71270) ([facility]) [[id number]] (Order [id number])      Chest pain is just with contrast unless ruling out PE than order needs to be changed to CTA Chest    Call [phone] [facility] CT dept    Future Appointments   Date Time Provider Departme

## 2019-04-01 ENCOUNTER — TELEPHONE (OUTPATIENT)
Dept: FAMILY MEDICINE CLINIC | Facility: CLINIC | Age: 24
End: 2019-04-01

## 2019-04-01 NOTE — TELEPHONE ENCOUNTER
CT of the chest was ordered, dept recommends changing the order to: \"without contrast\". Please advise.

## 2019-06-07 ENCOUNTER — OFFICE VISIT (OUTPATIENT)
Dept: FAMILY MEDICINE CLINIC | Facility: CLINIC | Age: 24
End: 2019-06-07

## 2019-06-07 VITALS
DIASTOLIC BLOOD PRESSURE: 70 MMHG | BODY MASS INDEX: 25.8 KG/M2 | WEIGHT: 142 LBS | HEIGHT: 62.25 IN | HEART RATE: 74 BPM | RESPIRATION RATE: 16 BRPM | SYSTOLIC BLOOD PRESSURE: 118 MMHG

## 2019-06-07 DIAGNOSIS — M67.432 GANGLION CYST OF WRIST, LEFT: ICD-10-CM

## 2019-06-07 DIAGNOSIS — L30.9 ECZEMA OF BOTH HANDS: Primary | ICD-10-CM

## 2019-06-07 PROCEDURE — 99214 OFFICE O/P EST MOD 30 MIN: CPT | Performed by: FAMILY MEDICINE

## 2019-06-07 NOTE — PROGRESS NOTES
HPI:    Patient ID: Barbara Gorman is a 21year old female. Left ganglion cyst present for a few months. Patient was seen for this before. She has US done to confirm the diagnosis.  It is now causing a fair amount of discomfort, it has grown larger in size a Left hand: She exhibits normal range of motion, no bony tenderness and normal capillary refill. Normal sensation noted. Normal strength noted.         Hands:  Left medial aspect of wrist with quarter sized raised ganglion cyst that is nontender to touc

## 2019-06-08 RX ORDER — AMLODIPINE BESYLATE 5 MG/1
5 TABLET ORAL DAILY
Qty: 30 TABLET | Refills: 0 | Status: CANCELLED | OUTPATIENT
Start: 2019-06-08 | End: 2019-07-08

## 2019-06-12 PROBLEM — M67.432 GANGLION CYST OF VOLAR ASPECT OF LEFT WRIST: Status: ACTIVE | Noted: 2019-06-12

## 2019-06-17 ENCOUNTER — OFFICE VISIT (OUTPATIENT)
Dept: FAMILY MEDICINE CLINIC | Facility: CLINIC | Age: 24
End: 2019-06-17

## 2019-06-17 VITALS
WEIGHT: 140 LBS | RESPIRATION RATE: 16 BRPM | BODY MASS INDEX: 25.44 KG/M2 | HEART RATE: 76 BPM | OXYGEN SATURATION: 99 % | HEIGHT: 62.25 IN | SYSTOLIC BLOOD PRESSURE: 118 MMHG | TEMPERATURE: 99 F | DIASTOLIC BLOOD PRESSURE: 74 MMHG

## 2019-06-17 DIAGNOSIS — M67.432 GANGLION CYST OF VOLAR ASPECT OF LEFT WRIST: ICD-10-CM

## 2019-06-17 DIAGNOSIS — G43.009 MIGRAINE WITHOUT AURA AND WITHOUT STATUS MIGRAINOSUS, NOT INTRACTABLE: Primary | ICD-10-CM

## 2019-06-17 DIAGNOSIS — Z63.0 STRESS DUE TO MARITAL PROBLEMS: ICD-10-CM

## 2019-06-17 PROCEDURE — 99214 OFFICE O/P EST MOD 30 MIN: CPT | Performed by: FAMILY MEDICINE

## 2019-06-17 RX ORDER — SUMATRIPTAN 50 MG/1
50 TABLET, FILM COATED ORAL 2 TIMES DAILY PRN
Qty: 15 TABLET | Refills: 0 | Status: SHIPPED | OUTPATIENT
Start: 2019-06-17 | End: 2020-01-10

## 2019-06-17 SDOH — SOCIAL STABILITY - SOCIAL INSECURITY: PROBLEMS IN RELATIONSHIP WITH SPOUSE OR PARTNER: Z63.0

## 2019-06-17 NOTE — PROGRESS NOTES
Patient presents with:  Migraine: Ongoing migraine headaches- she states when she touches her head she is very sensitive and she has some dizziness. It also effects her vision- shes not sure it is because she is stressed.       HPI:  lubna is here for f/u HENT: Negative for hearing loss, congestion, sore throat, neck pain and dental problem. Eyes: Negative for pain and visual disturbance. Respiratory: Negative for cough, chest tightness, shortness of breath and wheezing.     Cardiovascular: Negative for Constitutional: Oriented to person, place, and time. No distress. HEENT:  Normocephalic and atraumatic. Hearing and tympanic membranes normal.  Nose normal. Oropharynx is clear and moist. nontender at temples.     Eyes: Conjunctivae and EOM are normal. PE Recommended self-care and seeing a therapist. Patient declined therapy at this time. Also advised to get started on SSRI to help with her emotional lows. Patient declined medication at this time. She denies SI or HI.  Support given    25 minutes were spent · Hormones. Many women notice that migraines tend to happen at a certain point in their menstrual cycle. Birth control pills or hormone replacement therapy may also trigger migraines. · Environment and weather.  Air travel, changes in altitude, air pressur This often severe type of headache is different from other types of headaches in that symptoms other than pain occur with the headache.  Nausea and vomiting, lightheadedness, sensitivity to light (photophobia), and other visual disturbances are common migra If stress seems to be a trigger for your headaches, figure out what is causing stress in your life. Learn new ways to handle your stress. Ideas include regular exercise, biofeedback, self-hypnosis, yoga, and meditation.  Talk with your healthcare provider t Call your healthcare provider right away if any of these occur:  · Your head pain gets worse, or doesn’t get better within 24 hours  · You can’t keep liquids down (repeated vomiting)  · Pain in your sinuses, ears, or throat  · Fever of 100.4º F (38º C) or

## 2019-06-20 PROBLEM — Z63.0 STRESS DUE TO MARITAL PROBLEMS: Status: ACTIVE | Noted: 2019-06-20

## 2019-06-20 PROBLEM — Z34.90 PREGNANCY (HCC): Status: RESOLVED | Noted: 2017-08-13 | Resolved: 2019-06-20

## 2019-06-20 PROBLEM — Z98.891 STATUS POST CESAREAN DELIVERY: Status: RESOLVED | Noted: 2017-09-22 | Resolved: 2019-06-20

## 2019-06-20 PROBLEM — Z34.90 PREGNANCY: Status: RESOLVED | Noted: 2017-08-13 | Resolved: 2019-06-20

## 2019-06-20 PROBLEM — R07.9 CHEST PAIN: Status: RESOLVED | Noted: 2019-03-26 | Resolved: 2019-06-20

## 2020-01-10 ENCOUNTER — OFFICE VISIT (OUTPATIENT)
Dept: FAMILY MEDICINE CLINIC | Facility: CLINIC | Age: 25
End: 2020-01-10

## 2020-01-10 VITALS
SYSTOLIC BLOOD PRESSURE: 112 MMHG | TEMPERATURE: 98 F | DIASTOLIC BLOOD PRESSURE: 70 MMHG | HEART RATE: 76 BPM | RESPIRATION RATE: 18 BRPM | WEIGHT: 148 LBS | BODY MASS INDEX: 26.89 KG/M2 | HEIGHT: 62.25 IN

## 2020-01-10 DIAGNOSIS — H92.03 ACUTE EAR PAIN, BILATERAL: Primary | ICD-10-CM

## 2020-01-10 DIAGNOSIS — G43.009 MIGRAINE WITHOUT AURA AND WITHOUT STATUS MIGRAINOSUS, NOT INTRACTABLE: ICD-10-CM

## 2020-01-10 DIAGNOSIS — H65.92 LEFT OTITIS MEDIA WITH EFFUSION: ICD-10-CM

## 2020-01-10 DIAGNOSIS — F41.9 ANXIETY: ICD-10-CM

## 2020-01-10 PROCEDURE — 99214 OFFICE O/P EST MOD 30 MIN: CPT | Performed by: FAMILY MEDICINE

## 2020-01-10 RX ORDER — AMOXICILLIN 500 MG/1
500 CAPSULE ORAL 3 TIMES DAILY
Qty: 30 CAPSULE | Refills: 0 | Status: SHIPPED | OUTPATIENT
Start: 2020-01-10 | End: 2020-01-20

## 2020-01-10 RX ORDER — SUMATRIPTAN 50 MG/1
50 TABLET, FILM COATED ORAL 2 TIMES DAILY PRN
Qty: 15 TABLET | Refills: 1 | Status: ON HOLD | OUTPATIENT
Start: 2020-01-10 | End: 2020-08-22

## 2020-01-10 RX ORDER — NAPROXEN 500 MG/1
500 TABLET ORAL 2 TIMES DAILY WITH MEALS
Qty: 30 TABLET | Refills: 0 | Status: SHIPPED | OUTPATIENT
Start: 2020-01-10 | End: 2020-01-25

## 2020-01-10 RX ORDER — FLUOXETINE HYDROCHLORIDE 20 MG/1
20 CAPSULE ORAL DAILY
Qty: 30 CAPSULE | Refills: 0 | Status: SHIPPED | OUTPATIENT
Start: 2020-01-10 | End: 2020-02-09

## 2020-01-10 NOTE — PROGRESS NOTES
HPI:   Patient presents with:  Ear Pain: Bilateral ear pain- left side is much worse. Headache: ongoing headaches       David Aggarwal is a 25year old female who presents with ear pain and possible ear infection. Symptoms include: bilateral ear pain.  Wor thoughts without plan and suicidal thoughts with specific plan.        HISTORY:  Past Medical History:   Diagnosis Date   • Migraine without aura and without status migrainosus, not intractable 1/11/2020      Past Surgical History:   Procedure Laterality Da is not injected, not scarred, not perforated, not erythematous, not retracted and not bulging. Left Ear: Hearing, external ear and ear canal normal. No drainage, swelling or tenderness. No foreign bodies. No mastoid tenderness.  Tympanic membrane is injec workup: None. 2. Lifestyle changes: sleep hygiene, exercise  stress reduction  4. Abortive medications to use in the future: sumatriptan PO  5. Prophylactic medications: NSAIDs (naproxen)    Anxiety  Anxiety -  no suicidal or homicidal thoughts.  Will star

## 2020-01-10 NOTE — PATIENT INSTRUCTIONS
Treating Anxiety Disorders with Medicine  An anxiety disorder can make you feel nervous or apprehensive, even without a clear reason.  In people age 72 and older, generalized anxiety disorder is one of the most commonly diagnosed anxiety disorders. Many t Never use alcohol or other drugs with anti-anxiety medicines. This could result in loss of muscular control, sedation, coma, or death. Also, use only the amount of medicine prescribed for you.  If you think you may have taken too much, get emergency care ri · Addiction. If Michael Krishnamurthy never had a problem with drugs or alcohol, you may not have a problem with medicines used to treat anxiety disorders. But always discuss the medicines with your healthcare provider before taking them.  If you have a history of addicti

## 2020-01-11 PROBLEM — G43.009 MIGRAINE WITHOUT AURA AND WITHOUT STATUS MIGRAINOSUS, NOT INTRACTABLE: Status: ACTIVE | Noted: 2020-01-11

## 2020-08-04 ENCOUNTER — HOSPITAL ENCOUNTER (EMERGENCY)
Facility: HOSPITAL | Age: 25
Discharge: HOME | End: 2020-08-04
Attending: EMERGENCY MEDICINE
Payer: COMMERCIAL

## 2020-08-04 ENCOUNTER — APPOINTMENT (OUTPATIENT)
Dept: GENERAL RADIOLOGY | Facility: HOSPITAL | Age: 25
End: 2020-08-04
Attending: EMERGENCY MEDICINE
Payer: COMMERCIAL

## 2020-08-04 VITALS
HEART RATE: 115 BPM | DIASTOLIC BLOOD PRESSURE: 72 MMHG | WEIGHT: 143 LBS | HEIGHT: 62 IN | TEMPERATURE: 100 F | SYSTOLIC BLOOD PRESSURE: 105 MMHG | BODY MASS INDEX: 26.31 KG/M2 | OXYGEN SATURATION: 97 % | RESPIRATION RATE: 23 BRPM

## 2020-08-04 DIAGNOSIS — E86.0 DEHYDRATION: ICD-10-CM

## 2020-08-04 DIAGNOSIS — R11.2 NAUSEA AND VOMITING, INTRACTABILITY OF VOMITING NOT SPECIFIED, UNSPECIFIED VOMITING TYPE: ICD-10-CM

## 2020-08-04 DIAGNOSIS — N39.0 URINARY TRACT INFECTION WITHOUT HEMATURIA, SITE UNSPECIFIED: Primary | ICD-10-CM

## 2020-08-04 DIAGNOSIS — E87.6 HYPOKALEMIA: ICD-10-CM

## 2020-08-04 LAB
ALBUMIN SERPL-MCNC: 3.5 G/DL (ref 3.4–5)
ALBUMIN/GLOB SERPL: 0.8 {RATIO} (ref 1–2)
ALP LIVER SERPL-CCNC: 108 U/L (ref 37–98)
ALT SERPL-CCNC: 21 U/L (ref 13–56)
ANION GAP SERPL CALC-SCNC: 5 MMOL/L (ref 0–18)
AST SERPL-CCNC: 11 U/L (ref 15–37)
BASOPHILS # BLD AUTO: 0.03 X10(3) UL (ref 0–0.2)
BASOPHILS NFR BLD AUTO: 0.2 %
BILIRUB SERPL-MCNC: 0.5 MG/DL (ref 0.1–2)
BILIRUB UR QL STRIP.AUTO: NEGATIVE
BUN BLD-MCNC: 7 MG/DL (ref 7–18)
BUN/CREAT SERPL: 7.4 (ref 10–20)
CALCIUM BLD-MCNC: 8.8 MG/DL (ref 8.5–10.1)
CHLORIDE SERPL-SCNC: 106 MMOL/L (ref 98–112)
CO2 SERPL-SCNC: 25 MMOL/L (ref 21–32)
COLOR UR AUTO: YELLOW
CREAT BLD-MCNC: 0.94 MG/DL (ref 0.55–1.02)
DEPRECATED RDW RBC AUTO: 39.2 FL (ref 35.1–46.3)
EOSINOPHIL # BLD AUTO: 0.04 X10(3) UL (ref 0–0.7)
EOSINOPHIL NFR BLD AUTO: 0.3 %
ERYTHROCYTE [DISTWIDTH] IN BLOOD BY AUTOMATED COUNT: 12.6 % (ref 11–15)
GLOBULIN PLAS-MCNC: 4.4 G/DL (ref 2.8–4.4)
GLUCOSE BLD-MCNC: 131 MG/DL (ref 70–99)
GLUCOSE UR STRIP.AUTO-MCNC: NEGATIVE MG/DL
HCT VFR BLD AUTO: 40.7 % (ref 35–48)
HGB BLD-MCNC: 13.4 G/DL (ref 12–16)
IMM GRANULOCYTES # BLD AUTO: 0.05 X10(3) UL (ref 0–1)
IMM GRANULOCYTES NFR BLD: 0.4 %
KETONES UR STRIP.AUTO-MCNC: NEGATIVE MG/DL
LYMPHOCYTES # BLD AUTO: 1.56 X10(3) UL (ref 1–4)
LYMPHOCYTES NFR BLD AUTO: 12.3 %
M PROTEIN MFR SERPL ELPH: 7.9 G/DL (ref 6.4–8.2)
MCH RBC QN AUTO: 28.2 PG (ref 26–34)
MCHC RBC AUTO-ENTMCNC: 32.9 G/DL (ref 31–37)
MCV RBC AUTO: 85.5 FL (ref 80–100)
MONOCYTES # BLD AUTO: 1.34 X10(3) UL (ref 0.1–1)
MONOCYTES NFR BLD AUTO: 10.6 %
NEUTROPHILS # BLD AUTO: 9.63 X10 (3) UL (ref 1.5–7.7)
NEUTROPHILS # BLD AUTO: 9.63 X10(3) UL (ref 1.5–7.7)
NEUTROPHILS NFR BLD AUTO: 76.2 %
NITRITE UR QL STRIP.AUTO: NEGATIVE
OSMOLALITY SERPL CALC.SUM OF ELEC: 282 MOSM/KG (ref 275–295)
PH UR STRIP.AUTO: 7 [PH] (ref 4.5–8)
PLATELET # BLD AUTO: 203 10(3)UL (ref 150–450)
POCT LOT NUMBER: NORMAL
POCT URINE PREGNANCY: NEGATIVE
POTASSIUM SERPL-SCNC: 3.2 MMOL/L (ref 3.5–5.1)
PROT UR STRIP.AUTO-MCNC: NEGATIVE MG/DL
RBC # BLD AUTO: 4.76 X10(6)UL (ref 3.8–5.3)
SARS-COV-2 RNA RESP QL NAA+PROBE: NOT DETECTED
SODIUM SERPL-SCNC: 136 MMOL/L (ref 136–145)
SP GR UR STRIP.AUTO: <1.005 (ref 1–1.03)
UROBILINOGEN UR STRIP.AUTO-MCNC: <2 MG/DL
WBC # BLD AUTO: 12.7 X10(3) UL (ref 4–11)
WBC #/AREA URNS AUTO: >50 /HPF

## 2020-08-04 PROCEDURE — 80053 COMPREHEN METABOLIC PANEL: CPT

## 2020-08-04 PROCEDURE — 96375 TX/PRO/DX INJ NEW DRUG ADDON: CPT

## 2020-08-04 PROCEDURE — 87086 URINE CULTURE/COLONY COUNT: CPT | Performed by: EMERGENCY MEDICINE

## 2020-08-04 PROCEDURE — 81001 URINALYSIS AUTO W/SCOPE: CPT | Performed by: EMERGENCY MEDICINE

## 2020-08-04 PROCEDURE — 85025 COMPLETE CBC W/AUTO DIFF WBC: CPT | Performed by: EMERGENCY MEDICINE

## 2020-08-04 PROCEDURE — 87186 SC STD MICRODIL/AGAR DIL: CPT | Performed by: EMERGENCY MEDICINE

## 2020-08-04 PROCEDURE — 96361 HYDRATE IV INFUSION ADD-ON: CPT

## 2020-08-04 PROCEDURE — 96365 THER/PROPH/DIAG IV INF INIT: CPT

## 2020-08-04 PROCEDURE — 81025 URINE PREGNANCY TEST: CPT

## 2020-08-04 PROCEDURE — 87088 URINE BACTERIA CULTURE: CPT | Performed by: EMERGENCY MEDICINE

## 2020-08-04 PROCEDURE — 85025 COMPLETE CBC W/AUTO DIFF WBC: CPT

## 2020-08-04 PROCEDURE — 80053 COMPREHEN METABOLIC PANEL: CPT | Performed by: EMERGENCY MEDICINE

## 2020-08-04 PROCEDURE — 99284 EMERGENCY DEPT VISIT MOD MDM: CPT

## 2020-08-04 RX ORDER — POTASSIUM CHLORIDE 20 MEQ/1
40 TABLET, EXTENDED RELEASE ORAL ONCE
Status: COMPLETED | OUTPATIENT
Start: 2020-08-04 | End: 2020-08-04

## 2020-08-04 RX ORDER — CEPHALEXIN 500 MG/1
500 CAPSULE ORAL 4 TIMES DAILY
Qty: 40 CAPSULE | Refills: 0 | Status: SHIPPED | OUTPATIENT
Start: 2020-08-04 | End: 2020-08-14

## 2020-08-04 RX ORDER — ONDANSETRON 4 MG/1
4 TABLET, ORALLY DISINTEGRATING ORAL EVERY 4 HOURS PRN
Qty: 10 TABLET | Refills: 0 | Status: SHIPPED | OUTPATIENT
Start: 2020-08-04 | End: 2020-08-10 | Stop reason: ALTCHOICE

## 2020-08-04 RX ORDER — DIPHENHYDRAMINE HYDROCHLORIDE 50 MG/ML
25 INJECTION INTRAMUSCULAR; INTRAVENOUS ONCE
Status: COMPLETED | OUTPATIENT
Start: 2020-08-04 | End: 2020-08-04

## 2020-08-04 RX ORDER — ONDANSETRON 2 MG/ML
4 INJECTION INTRAMUSCULAR; INTRAVENOUS ONCE
Status: COMPLETED | OUTPATIENT
Start: 2020-08-04 | End: 2020-08-04

## 2020-08-04 NOTE — ED INITIAL ASSESSMENT (HPI)
Pt to ED with complaints of nausea and vomiting, lack of appetite for 3 days. Pt also reported feeling fatigued and headache. Pt denies anyone at home being sick.

## 2020-08-05 NOTE — ED PROVIDER NOTES
Patient Seen in: [facility] Emergency Department      History   Patient presents with:  Fatigue  Headache  Back Pain  Abdomen/Flank Pain    Stated Complaint: Pt reports was moving stuff around house last 2-3 days and now has headache, ab* signs reviewed. All other systems reviewed and negative except as noted above.     Physical Exam     ED Triage Vitals [08/04/20 1447]   /75   Pulse 120   Resp 19   Temp 100.2 °F (37.9 °C)   Temp src Oral   SpO2 98 %   O2 Device None (Room air) alert and oriented to person, place, and time. Cranial Nerves: No cranial nerve deficit.       Coordination: Coordination normal.   Psychiatric:         Behavior: Behavior normal.         Judgment: Judgment normal.             ED Course     Labs Review nausea and vomiting, back pain and abdominal pain and feeling generally tired.   She has attributed this to overexertion causing a headache and then the nausea and vomiting however she states that she is having nausea and vomiting with any oral intake which medications    cephALEXin 500 MG Oral Cap  Take 1 capsule (500 mg total) by mouth 4 (four) times daily for 10 days. , Normal, Disp-40 capsule, R-0    ondansetron 4 MG Oral Tablet Dispersible  Take 1 tablet (4 mg total) by mouth every 4 (four) hours as neede

## 2020-08-10 ENCOUNTER — OFFICE VISIT (OUTPATIENT)
Dept: FAMILY MEDICINE CLINIC | Facility: CLINIC | Age: 25
End: 2020-08-10
Payer: COMMERCIAL

## 2020-08-10 ENCOUNTER — TELEPHONE (OUTPATIENT)
Dept: FAMILY MEDICINE CLINIC | Facility: CLINIC | Age: 25
End: 2020-08-10

## 2020-08-10 VITALS
HEIGHT: 62 IN | BODY MASS INDEX: 25.4 KG/M2 | HEART RATE: 74 BPM | WEIGHT: 138 LBS | OXYGEN SATURATION: 98 % | TEMPERATURE: 98 F | DIASTOLIC BLOOD PRESSURE: 74 MMHG | RESPIRATION RATE: 16 BRPM | SYSTOLIC BLOOD PRESSURE: 122 MMHG

## 2020-08-10 DIAGNOSIS — H65.193 ACUTE MIDDLE EAR EFFUSION, BILATERAL: Primary | ICD-10-CM

## 2020-08-10 DIAGNOSIS — H60.311 ACUTE DIFFUSE OTITIS EXTERNA OF RIGHT EAR: ICD-10-CM

## 2020-08-10 PROCEDURE — 99213 OFFICE O/P EST LOW 20 MIN: CPT | Performed by: FAMILY MEDICINE

## 2020-08-10 PROCEDURE — 3074F SYST BP LT 130 MM HG: CPT | Performed by: FAMILY MEDICINE

## 2020-08-10 PROCEDURE — 3078F DIAST BP <80 MM HG: CPT | Performed by: FAMILY MEDICINE

## 2020-08-10 PROCEDURE — 3008F BODY MASS INDEX DOCD: CPT | Performed by: FAMILY MEDICINE

## 2020-08-10 RX ORDER — METHYLPREDNISOLONE 4 MG/1
TABLET ORAL
Qty: 1 KIT | Refills: 0 | Status: SHIPPED | OUTPATIENT
Start: 2020-08-10 | End: 2020-08-22

## 2020-08-10 RX ORDER — CIPROFLOXACIN AND DEXAMETHASONE 3; 1 MG/ML; MG/ML
4 SUSPENSION/ DROPS AURICULAR (OTIC) 2 TIMES DAILY
Qty: 7.5 ML | Refills: 0 | Status: SHIPPED | OUTPATIENT
Start: 2020-08-10 | End: 2020-08-20

## 2020-08-10 NOTE — TELEPHONE ENCOUNTER
Called patient to gain further information on reason for the visit and to make sure that the appointment time was long enough in duration. Also, called to inform patient that the insurance on file was showing ineligibility.  Spoke to patient's  who t

## 2020-08-11 NOTE — PROGRESS NOTES
HPI:    Patient ID: Barbara Gorman is a 25year old female. Ear Pain    There is pain in both ears. This is a new problem. The current episode started in the past 7 days. The problem occurs constantly. There has been no fever.  The pain is at a severity of 5 No drainage, swelling or tenderness. No foreign bodies. No mastoid tenderness. Tympanic membrane is not injected, not scarred, not perforated, not erythematous, not retracted and not bulging. A middle ear effusion is present. No hemotympanum.  No decreased 4 MG Oral Tablet Therapy Pack 1 kit 0     Sig: As directed.        Imaging & Referrals:  None       IJ#[id number]

## 2020-08-18 ENCOUNTER — TELEPHONE (OUTPATIENT)
Dept: FAMILY MEDICINE CLINIC | Facility: CLINIC | Age: 25
End: 2020-08-18

## 2020-08-18 ENCOUNTER — VIRTUAL PHONE E/M (OUTPATIENT)
Dept: FAMILY MEDICINE CLINIC | Facility: CLINIC | Age: 25
End: 2020-08-18
Payer: COMMERCIAL

## 2020-08-18 DIAGNOSIS — N12 PYELONEPHRITIS: Primary | ICD-10-CM

## 2020-08-18 PROCEDURE — 99213 OFFICE O/P EST LOW 20 MIN: CPT | Performed by: FAMILY MEDICINE

## 2020-08-18 RX ORDER — NITROFURANTOIN 25; 75 MG/1; MG/1
100 CAPSULE ORAL 2 TIMES DAILY
Qty: 14 CAPSULE | Refills: 0 | Status: ON HOLD | OUTPATIENT
Start: 2020-08-18 | End: 2020-08-28

## 2020-08-18 RX ORDER — CIPROFLOXACIN 250 MG/1
250 TABLET, FILM COATED ORAL 2 TIMES DAILY
Qty: 20 TABLET | Refills: 0 | Status: SHIPPED | OUTPATIENT
Start: 2020-08-18 | End: 2020-08-18

## 2020-08-18 RX ORDER — ONDANSETRON 4 MG/1
4 TABLET, FILM COATED ORAL EVERY 8 HOURS PRN
Qty: 15 TABLET | Refills: 0 | Status: ON HOLD | OUTPATIENT
Start: 2020-08-18 | End: 2020-08-28

## 2020-08-18 NOTE — PROGRESS NOTES
Telephone Check-In    [facility] verbally consents to a Virtual/Telephone Check-In service on 08/18/20. Patient understands and accepts financial responsibility for any deductible, co-insurance and/or co-pays associated with this service.     Fever    This Nausea. 15 tablet 0   • Nitrofurantoin Monohyd Macro 100 MG Oral Cap Take 1 capsule (100 mg total) by mouth 2 (two) times daily for 7 days.  14 capsule 0   • ciprofloxacin-dexamethasone 0.3-0.1 % Otic Suspension Place 4 drops into the right ear 2 (two) time mg total) by mouth 2 (two) times daily for 7 days.        Imaging & Referrals:  None       #[id number]

## 2020-08-18 NOTE — PATIENT INSTRUCTIONS
Discharge Instructions for Pyelonephritis  You have been told you have a kidney infection. This is called pyelonephritis. The infection can be serious.  It can damage your kidneys and cause bacteria to enter your bloodstream. You were treated in the hospi © 9651-8236 [employer] [address]. [address] [facility], [facility] [place]. All rights reserved. This information is not intended as a substitute for professional medical care. Always follow your healthcare professional's instructions.         Corbin Yepezos · Nausea or other problems that prevent you from taking your prescribed medicine  · New or worsening symptoms  Jennifer last reviewed this educational content on 4/1/2020  © 2147-4454 [employer] [address]. [address] [facility], [address] [place], [place] [address].  Al

## 2020-08-18 NOTE — TELEPHONE ENCOUNTER
Pt came into office for appt with Dr. Gagandeep Wyman. Patients temp was 102.9. Waited a minute since hot outside and took temp again-was 101. 3. Took a third time and was 102.  Instructed patient to go out to her car, talked with Dr. Gagandeep Wyman, and changed visit in

## 2020-08-22 ENCOUNTER — APPOINTMENT (OUTPATIENT)
Dept: CT IMAGING | Facility: HOSPITAL | Age: 25
DRG: 690 | End: 2020-08-22
Attending: EMERGENCY MEDICINE
Payer: COMMERCIAL

## 2020-08-22 ENCOUNTER — HOSPITAL ENCOUNTER (INPATIENT)
Facility: HOSPITAL | Age: 25
LOS: 6 days | Discharge: HOME | DRG: 690 | End: 2020-08-28
Attending: EMERGENCY MEDICINE | Admitting: HOSPITALIST
Payer: COMMERCIAL

## 2020-08-22 DIAGNOSIS — N15.1 RENAL ABSCESS: ICD-10-CM

## 2020-08-22 DIAGNOSIS — N12 PYELONEPHRITIS: Primary | ICD-10-CM

## 2020-08-22 LAB
ALBUMIN SERPL-MCNC: 3.3 G/DL (ref 3.4–5)
ALBUMIN/GLOB SERPL: 0.6 {RATIO} (ref 1–2)
ALP LIVER SERPL-CCNC: 118 U/L (ref 37–98)
ALT SERPL-CCNC: 31 U/L (ref 13–56)
ANION GAP SERPL CALC-SCNC: 3 MMOL/L (ref 0–18)
AST SERPL-CCNC: 12 U/L (ref 15–37)
BASOPHILS # BLD AUTO: 0.03 X10(3) UL (ref 0–0.2)
BASOPHILS NFR BLD AUTO: 0.3 %
BILIRUB SERPL-MCNC: 0.3 MG/DL (ref 0.1–2)
BILIRUB UR QL STRIP.AUTO: NEGATIVE
BUN BLD-MCNC: 5 MG/DL (ref 7–18)
BUN/CREAT SERPL: 7.9 (ref 10–20)
CALCIUM BLD-MCNC: 9.3 MG/DL (ref 8.5–10.1)
CHLORIDE SERPL-SCNC: 105 MMOL/L (ref 98–112)
CLARITY UR REFRACT.AUTO: CLEAR
CO2 SERPL-SCNC: 31 MMOL/L (ref 21–32)
COLOR UR AUTO: YELLOW
CREAT BLD-MCNC: 0.63 MG/DL (ref 0.55–1.02)
DEPRECATED RDW RBC AUTO: 41.4 FL (ref 35.1–46.3)
EOSINOPHIL # BLD AUTO: 0.07 X10(3) UL (ref 0–0.7)
EOSINOPHIL NFR BLD AUTO: 0.7 %
ERYTHROCYTE [DISTWIDTH] IN BLOOD BY AUTOMATED COUNT: 13 % (ref 11–15)
GLOBULIN PLAS-MCNC: 5.1 G/DL (ref 2.8–4.4)
GLUCOSE BLD-MCNC: 73 MG/DL (ref 70–99)
GLUCOSE UR STRIP.AUTO-MCNC: NEGATIVE MG/DL
HCT VFR BLD AUTO: 40.9 % (ref 35–48)
HGB BLD-MCNC: 13.2 G/DL (ref 12–16)
IMM GRANULOCYTES # BLD AUTO: 0.04 X10(3) UL (ref 0–1)
IMM GRANULOCYTES NFR BLD: 0.4 %
KETONES UR STRIP.AUTO-MCNC: NEGATIVE MG/DL
LEUKOCYTE ESTERASE UR QL STRIP.AUTO: NEGATIVE
LYMPHOCYTES # BLD AUTO: 2.87 X10(3) UL (ref 1–4)
LYMPHOCYTES NFR BLD AUTO: 28.1 %
M PROTEIN MFR SERPL ELPH: 8.4 G/DL (ref 6.4–8.2)
MCH RBC QN AUTO: 28.1 PG (ref 26–34)
MCHC RBC AUTO-ENTMCNC: 32.3 G/DL (ref 31–37)
MCV RBC AUTO: 87 FL (ref 80–100)
MONOCYTES # BLD AUTO: 0.83 X10(3) UL (ref 0.1–1)
MONOCYTES NFR BLD AUTO: 8.1 %
NEUTROPHILS # BLD AUTO: 6.37 X10 (3) UL (ref 1.5–7.7)
NEUTROPHILS # BLD AUTO: 6.37 X10(3) UL (ref 1.5–7.7)
NEUTROPHILS NFR BLD AUTO: 62.4 %
NITRITE UR QL STRIP.AUTO: NEGATIVE
OSMOLALITY SERPL CALC.SUM OF ELEC: 284 MOSM/KG (ref 275–295)
PH UR STRIP.AUTO: 7 [PH] (ref 4.5–8)
PLATELET # BLD AUTO: 402 10(3)UL (ref 150–450)
POCT LOT NUMBER: NORMAL
POCT URINE PREGNANCY: NEGATIVE
POTASSIUM SERPL-SCNC: 3.3 MMOL/L (ref 3.5–5.1)
PROCEDURE CONTROL: YES
PROT UR STRIP.AUTO-MCNC: NEGATIVE MG/DL
RBC # BLD AUTO: 4.7 X10(6)UL (ref 3.8–5.3)
SARS-COV-2 RNA RESP QL NAA+PROBE: NOT DETECTED
SODIUM SERPL-SCNC: 139 MMOL/L (ref 136–145)
SP GR UR STRIP.AUTO: <1.005 (ref 1–1.03)
UROBILINOGEN UR STRIP.AUTO-MCNC: <2 MG/DL
WBC # BLD AUTO: 10.2 X10(3) UL (ref 4–11)

## 2020-08-22 PROCEDURE — 99223 1ST HOSP IP/OBS HIGH 75: CPT | Performed by: HOSPITALIST

## 2020-08-22 PROCEDURE — 74177 CT ABD & PELVIS W/CONTRAST: CPT | Performed by: EMERGENCY MEDICINE

## 2020-08-22 RX ORDER — MORPHINE SULFATE 4 MG/ML
1 INJECTION, SOLUTION INTRAMUSCULAR; INTRAVENOUS EVERY 2 HOUR PRN
Status: DISCONTINUED | OUTPATIENT
Start: 2020-08-22 | End: 2020-08-25

## 2020-08-22 RX ORDER — MORPHINE SULFATE 4 MG/ML
4 INJECTION, SOLUTION INTRAMUSCULAR; INTRAVENOUS EVERY 2 HOUR PRN
Status: DISCONTINUED | OUTPATIENT
Start: 2020-08-22 | End: 2020-08-23

## 2020-08-22 RX ORDER — METOCLOPRAMIDE HYDROCHLORIDE 5 MG/ML
5 INJECTION INTRAMUSCULAR; INTRAVENOUS EVERY 8 HOURS PRN
Status: DISCONTINUED | OUTPATIENT
Start: 2020-08-22 | End: 2020-08-28

## 2020-08-22 RX ORDER — ONDANSETRON 2 MG/ML
4 INJECTION INTRAMUSCULAR; INTRAVENOUS EVERY 6 HOURS PRN
Status: DISCONTINUED | OUTPATIENT
Start: 2020-08-22 | End: 2020-08-28

## 2020-08-22 RX ORDER — SODIUM CHLORIDE, SODIUM LACTATE, POTASSIUM CHLORIDE, CALCIUM CHLORIDE 600; 310; 30; 20 MG/100ML; MG/100ML; MG/100ML; MG/100ML
INJECTION, SOLUTION INTRAVENOUS CONTINUOUS
Status: DISCONTINUED | OUTPATIENT
Start: 2020-08-22 | End: 2020-08-28

## 2020-08-22 RX ORDER — TEMAZEPAM 7.5 MG/1
7.5 CAPSULE ORAL NIGHTLY PRN
Status: DISCONTINUED | OUTPATIENT
Start: 2020-08-22 | End: 2020-08-28

## 2020-08-22 RX ORDER — MORPHINE SULFATE 4 MG/ML
2 INJECTION, SOLUTION INTRAMUSCULAR; INTRAVENOUS EVERY 2 HOUR PRN
Status: DISCONTINUED | OUTPATIENT
Start: 2020-08-22 | End: 2020-08-25

## 2020-08-22 RX ORDER — POTASSIUM CHLORIDE 20 MEQ/1
40 TABLET, EXTENDED RELEASE ORAL EVERY 4 HOURS
Status: COMPLETED | OUTPATIENT
Start: 2020-08-22 | End: 2020-08-23

## 2020-08-22 RX ORDER — ENOXAPARIN SODIUM 100 MG/ML
40 INJECTION SUBCUTANEOUS NIGHTLY
Status: DISCONTINUED | OUTPATIENT
Start: 2020-08-22 | End: 2020-08-28

## 2020-08-22 RX ORDER — ACETAMINOPHEN 325 MG/1
650 TABLET ORAL EVERY 6 HOURS PRN
Status: DISCONTINUED | OUTPATIENT
Start: 2020-08-22 | End: 2020-08-28

## 2020-08-22 NOTE — ED NOTES
In room to collect COVID swab. Began crying before specimen obtained. Pulled staff's hands away while trying to obtain specimen.   Spouse at bedside, angry with staff stating staff was going too far in the nose to perform the test and that the person who

## 2020-08-22 NOTE — ED PROVIDER NOTES
Patient Seen in: [facility] Emergency Department      History   Patient presents with:  Urinary Symptoms    Stated Complaint: UTI failed abx    HPI    Patient is a 69-year-old female comes to emergency room for evaluation of vomiting.   Patient was i Smoking status: Never Smoker      Smokeless tobacco: Never Used    Alcohol use: No      Alcohol/week: 0.0 standard drinks    Drug use: No             Review of Systems    Positive for stated complaint: UTI failed abx  Other systems are as noted in HPI. Protein 8.4 (*)     Albumin 3.3 (*)     Globulin  5.1 (*)     A/G Ratio 0.6 (*)     All other components within normal limits   URINALYSIS WITH CULTURE REFLEX - Abnormal; Notable for the following components:    Blood Urine Small (*)     Bacteria Urine Rar stranding ADRENALS:  Normal.  No mass or enlargement. KIDNEYS:  Heterogeneous enhancement pattern within the right kidney suggesting pyelonephritis.   There is a 2.6 x 2.8 x 2.3 cm hypodense collection with thick wall within the right kidney which may repr Impression:  Pyelonephritis  (primary encounter diagnosis)  Renal abscess    Disposition:  Admit  8/22/2020  4:52 pm    Follow-up:  No follow-up provider specified.         Medications Prescribed:  Current Discharge Medication List                       Pre

## 2020-08-23 LAB — POTASSIUM SERPL-SCNC: 4.4 MMOL/L (ref 3.5–5.1)

## 2020-08-23 PROCEDURE — 99233 SBSQ HOSP IP/OBS HIGH 50: CPT | Performed by: STUDENT IN AN ORGANIZED HEALTH CARE EDUCATION/TRAINING PROGRAM

## 2020-08-23 RX ORDER — KETOROLAC TROMETHAMINE 30 MG/ML
30 INJECTION, SOLUTION INTRAMUSCULAR; INTRAVENOUS EVERY 6 HOURS PRN
Status: DISPENSED | OUTPATIENT
Start: 2020-08-23 | End: 2020-08-25

## 2020-08-23 NOTE — PROGRESS NOTES
NURSING ADMISSION NOTE      Patient admitted via Cart  Oriented to room. Safety precautions initiated. Bed in low position. Call light in reach.   Pt admitted from ED for pyelonephritis, aaox4, denies pain, accompanied by , palmern was infusing

## 2020-08-23 NOTE — PROGRESS NOTES
Patient declined pain medications this shift states that she feels better today. Appetite has been good  has been at bedside questions answered.

## 2020-08-23 NOTE — PROGRESS NOTES
[facility]  Progress Note     Abisai Leggett Patient Status:  Inpatient    10/17/1995 MRN [id number]   [facility] 4NW-A Attending Naila Kidd MD   Hosp Day # 1 PCP Jason Cruz DO     Chief Complaint: REnal abscess     S: Patient ASSESSMENT / PLAN:     1. ESBL UTI/ pyelonephritis   2.  Renal Abscess    Plan of care:   IV meropenem, IR drainage  ID Cs     Quality:  · DVT Prophylaxis: Lovenox  · CODE status: full  · Brandt: no  · Central line: no    Will the patient be referred t

## 2020-08-23 NOTE — H&P
[facility]  History and Physical     Hiba Oleksandr Patient Status:  Inpatient    10/17/1995 MRN [id number]   [facility] 4NW-A Attending January Daniels MD   Hosp Day # 0 PCP Elly Vargas DO     Chief Complaint: back pain    Histo HPI.    Physical Exam:    /68   Pulse 90   Temp 97.3 °F (36.3 °C)   Resp 18   Ht 5' 2\" (1.575 m)   Wt 138 lb 0.1 oz (62.6 kg)   LMP 07/23/2020   SpO2 100%   BMI 25.24 kg/m²   General: No acute distress. Alert and oriented x 3.   HEENT: Normocephalic

## 2020-08-23 NOTE — PLAN OF CARE
Problem: PAIN - ADULT  Goal: Verbalizes/displays adequate comfort level or patient's stated pain goal  Description  INTERVENTIONS:  - Encourage pt to monitor pain and request assistance  - Assess pain using appropriate pain scale  - Administer analgesics Patient refused.

## 2020-08-24 ENCOUNTER — APPOINTMENT (OUTPATIENT)
Dept: CT IMAGING | Facility: HOSPITAL | Age: 25
DRG: 690 | End: 2020-08-24
Attending: STUDENT IN AN ORGANIZED HEALTH CARE EDUCATION/TRAINING PROGRAM
Payer: COMMERCIAL

## 2020-08-24 LAB
BASOPHILS # BLD AUTO: 0.02 X10(3) UL (ref 0–0.2)
BASOPHILS NFR BLD AUTO: 0.2 %
DEPRECATED RDW RBC AUTO: 44.9 FL (ref 35.1–46.3)
EOSINOPHIL # BLD AUTO: 0.14 X10(3) UL (ref 0–0.7)
EOSINOPHIL NFR BLD AUTO: 1.5 %
ERYTHROCYTE [DISTWIDTH] IN BLOOD BY AUTOMATED COUNT: 13.2 % (ref 11–15)
HCT VFR BLD AUTO: 39.5 % (ref 35–48)
HGB BLD-MCNC: 11.9 G/DL (ref 12–16)
IMM GRANULOCYTES # BLD AUTO: 0.03 X10(3) UL (ref 0–1)
IMM GRANULOCYTES NFR BLD: 0.3 %
INR BLD: 1.12 (ref 0.89–1.11)
LYMPHOCYTES # BLD AUTO: 2.31 X10(3) UL (ref 1–4)
LYMPHOCYTES NFR BLD AUTO: 24.3 %
MCH RBC QN AUTO: 28 PG (ref 26–34)
MCHC RBC AUTO-ENTMCNC: 30.1 G/DL (ref 31–37)
MCV RBC AUTO: 92.9 FL (ref 80–100)
MONOCYTES # BLD AUTO: 0.71 X10(3) UL (ref 0.1–1)
MONOCYTES NFR BLD AUTO: 7.5 %
NEUTROPHILS # BLD AUTO: 6.29 X10 (3) UL (ref 1.5–7.7)
NEUTROPHILS # BLD AUTO: 6.29 X10(3) UL (ref 1.5–7.7)
NEUTROPHILS NFR BLD AUTO: 66.2 %
PLATELET # BLD AUTO: 350 10(3)UL (ref 150–450)
PSA SERPL DL<=0.01 NG/ML-MCNC: 14.7 SECONDS (ref 12.4–14.6)
RBC # BLD AUTO: 4.25 X10(6)UL (ref 3.8–5.3)
WBC # BLD AUTO: 9.5 X10(3) UL (ref 4–11)

## 2020-08-24 PROCEDURE — 99152 MOD SED SAME PHYS/QHP 5/>YRS: CPT | Performed by: STUDENT IN AN ORGANIZED HEALTH CARE EDUCATION/TRAINING PROGRAM

## 2020-08-24 PROCEDURE — 49405 IMAGE CATH FLUID COLXN VISC: CPT | Performed by: STUDENT IN AN ORGANIZED HEALTH CARE EDUCATION/TRAINING PROGRAM

## 2020-08-24 PROCEDURE — 0T903ZX DRAINAGE OF RIGHT KIDNEY, PERCUTANEOUS APPROACH, DIAGNOSTIC: ICD-10-PCS | Performed by: RADIOLOGY

## 2020-08-24 PROCEDURE — 99232 SBSQ HOSP IP/OBS MODERATE 35: CPT | Performed by: STUDENT IN AN ORGANIZED HEALTH CARE EDUCATION/TRAINING PROGRAM

## 2020-08-24 RX ORDER — MIDAZOLAM HYDROCHLORIDE 1 MG/ML
INJECTION INTRAMUSCULAR; INTRAVENOUS
Status: COMPLETED
Start: 2020-08-24 | End: 2020-08-24

## 2020-08-24 RX ORDER — NALOXONE HYDROCHLORIDE 0.4 MG/ML
80 INJECTION, SOLUTION INTRAMUSCULAR; INTRAVENOUS; SUBCUTANEOUS AS NEEDED
Status: DISCONTINUED | OUTPATIENT
Start: 2020-08-24 | End: 2020-08-27 | Stop reason: HOSPADM

## 2020-08-24 RX ORDER — DIPHENHYDRAMINE HYDROCHLORIDE 50 MG/ML
INJECTION INTRAMUSCULAR; INTRAVENOUS
Status: COMPLETED
Start: 2020-08-24 | End: 2020-08-24

## 2020-08-24 RX ORDER — ALPRAZOLAM 0.25 MG/1
0.25 TABLET ORAL ONCE
Status: COMPLETED | OUTPATIENT
Start: 2020-08-24 | End: 2020-08-24

## 2020-08-24 RX ORDER — SODIUM CHLORIDE 9 MG/ML
INJECTION, SOLUTION INTRAVENOUS CONTINUOUS
Status: DISCONTINUED | OUTPATIENT
Start: 2020-08-24 | End: 2020-08-27 | Stop reason: HOSPADM

## 2020-08-24 RX ORDER — MIDAZOLAM HYDROCHLORIDE 1 MG/ML
1 INJECTION INTRAMUSCULAR; INTRAVENOUS EVERY 5 MIN PRN
Status: ACTIVE | OUTPATIENT
Start: 2020-08-24 | End: 2020-08-24

## 2020-08-24 RX ORDER — FLUMAZENIL 0.1 MG/ML
0.2 INJECTION, SOLUTION INTRAVENOUS AS NEEDED
Status: DISCONTINUED | OUTPATIENT
Start: 2020-08-24 | End: 2020-08-27 | Stop reason: HOSPADM

## 2020-08-24 NOTE — PROGRESS NOTES
[facility]  Progress Note     Hiba Oleksandr Patient Status:  Inpatient    10/17/1995 MRN [id number]   [facility] 4NW-A Attending Jalen Vera MD   Hosp Day # 2 PCP Floridalma Butler DO     Chief Complaint: REnal abscess     S: Patient Epic.    Medications:   • meropenem  500 mg Intravenous Q8H   • enoxaparin  40 mg Subcutaneous Nightly       ASSESSMENT / PLAN:     1. ESBL UTI/ pyelonephritis   2.  Renal Abscess    Plan of care:   IR drainage of abscess  ID eval     Quality:  · DVT Prophy

## 2020-08-24 NOTE — PROGRESS NOTES
Patient and her  informed about the plan of care for today explained that her procedure will be around 1pm and that labs were needed this am. They verbalized there understanding of teaching.

## 2020-08-24 NOTE — PROGRESS NOTES
Patient returned from procedure in ir has a bulb drain rt lateral side dressing to area dry and intact noted small amount of pink secretions in bulb.patient states at that time that she is in a lot of pain .

## 2020-08-24 NOTE — PLAN OF CARE
Problem: PAIN - ADULT  Goal: Verbalizes/displays adequate comfort level or patient's stated pain goal  Description  INTERVENTIONS:  - Encourage pt to monitor pain and request assistance  - Assess pain using appropriate pain scale  - Administer analgesics trends  - Administer supportive blood products/factors, fluids and medications as ordered and appropriate  - Administer supportive blood products/factors as ordered and appropriate  Outcome: Progressing     Problem: GENITOURINARY - ADULT  Goal: Absence of

## 2020-08-24 NOTE — PAYOR COMM NOTE
--------------  ADMISSION REVIEW     Payor: Nya Lopez  #:  [id number]  Authorization Number: [id number]    Admit date: 8/22/20  Admit time: 12902 Alejandra Kuo       Admitting Physician: Nerissa Murray MD  Attending Physician:  Estella Vazquez MD  [facility] Patient is a 40-year-old female comes to emergency room for evaluation of vomiting. Patient was initially seen in emergency room on 8/5 for fatigue, nausea, vomiting abdominal pain and back pain.   Patient had work-up done and was sent home on antibiotics Review of Systems    Positive for stated complaint: UTI failed abx  Other systems are as noted in HPI. Constitutional and vital signs reviewed. All other systems reviewed and negative except as noted above.     Physical Exam     ED Triage Vitals   BP URINALYSIS WITH CULTURE REFLEX - Abnormal; Notable for the following components:    Blood Urine Small (*)     Bacteria Urine Rare (*)     All other components within normal limits   CBC WITH DIFFERENTIAL WITH PLATELET    Narrative:      The following orders PROCEDURE:  CT ABDOMEN PELVIS IV CONTRAST, NO ORAL (ER)  COMPARISON:  None.   INDICATIONS:  UTI failed abx  TECHNIQUE:  CT scanning was performed from the dome of the diaphragm to the pubic symphysis with non-ionic intravenous contrast material. Post contra CONCLUSION:  1. Heterogeneous enhancement pattern of the right kidney suggesting pyelonephritis. There is a 2.6 x 2.8 x 2.3 cm hypodense lesion with thick wall within the right kidney which could represent an abscess.  2. Colonic diverticulosis without regi :  Jonny Enriquez MD (Physician)           [facility]  [facility] and [facility]     Abisai Leggett Patient Status:  Inpatient    10/17/1995 MRN [id number]   [facility] 4NW-A Attending Jonny Enriquez MD   Hosp Day # 0 PCP Katherine Hector Pertinent positives and negatives noted in the HPI.     Physical Exam:    /68   Pulse 90   Temp 97.3 °F (36.3 °C)   Resp 18   Ht 5' 2\" (1.575 m)   Wt 138 lb 0.1 oz (62.6 kg)   LMP 07/23/2020   SpO2 100%   BMI 25.24 kg/m²    General: No acute distress MEDICATIONS ADMINISTERED IN LAST 1 DAY:  acetaminophen (TYLENOL) tab 650 mg     Date Action Dose Route User    8/23/2020 2035 Given 650 mg Oral Aristides Gtz, RN      diphenhydrAMINE HCl (BENADRYL) 50 MG/ML IV PUSH injection     Date Action Dose Rout 0.9% NaCl infusion     Date Action Dose Route User    8/24/2020 1204 E Celio Hernandez (none) Intravenous YANNICK Camargo MD   Physician   Radiology   Procedures   Signed   Date of Service:  8/24/2020  2:05 PM            Pre-procedure Diagnos

## 2020-08-24 NOTE — PROGRESS NOTES
Patient states that she cant move her legs and that she was not able to breath patient was breathing fast and afraid  to move because of drain placement oxygen saturation on room air 100% patient was able to move her legs she was given morphine for pain.  P

## 2020-08-24 NOTE — IMAGING NOTE
[facility], name, date of birth and allergies verified with pt. Pt here for CT Renal Abscess Drain2. States NPO since last night. Pre procedure vitals checked. IV access to left antecubital are. 0.9 NS IV initiated to maintain patency.   Informed consent ob

## 2020-08-25 LAB
ANION GAP SERPL CALC-SCNC: 5 MMOL/L (ref 0–18)
BASOPHILS # BLD AUTO: 0.02 X10(3) UL (ref 0–0.2)
BASOPHILS NFR BLD AUTO: 0.3 %
BUN BLD-MCNC: 7 MG/DL (ref 7–18)
BUN/CREAT SERPL: 12.1 (ref 10–20)
CALCIUM BLD-MCNC: 9.1 MG/DL (ref 8.5–10.1)
CHLORIDE SERPL-SCNC: 107 MMOL/L (ref 98–112)
CO2 SERPL-SCNC: 27 MMOL/L (ref 21–32)
CREAT BLD-MCNC: 0.58 MG/DL (ref 0.55–1.02)
DEPRECATED RDW RBC AUTO: 43.1 FL (ref 35.1–46.3)
EOSINOPHIL # BLD AUTO: 0.14 X10(3) UL (ref 0–0.7)
EOSINOPHIL NFR BLD AUTO: 2 %
ERYTHROCYTE [DISTWIDTH] IN BLOOD BY AUTOMATED COUNT: 13.2 % (ref 11–15)
GLUCOSE BLD-MCNC: 88 MG/DL (ref 70–99)
HCT VFR BLD AUTO: 35.6 % (ref 35–48)
HGB BLD-MCNC: 11.1 G/DL (ref 12–16)
IMM GRANULOCYTES # BLD AUTO: 0.02 X10(3) UL (ref 0–1)
IMM GRANULOCYTES NFR BLD: 0.3 %
LYMPHOCYTES # BLD AUTO: 2.03 X10(3) UL (ref 1–4)
LYMPHOCYTES NFR BLD AUTO: 28.9 %
MCH RBC QN AUTO: 27.8 PG (ref 26–34)
MCHC RBC AUTO-ENTMCNC: 31.2 G/DL (ref 31–37)
MCV RBC AUTO: 89.2 FL (ref 80–100)
MONOCYTES # BLD AUTO: 0.14 X10(3) UL (ref 0.1–1)
MONOCYTES NFR BLD AUTO: 2 %
NEUTROPHILS # BLD AUTO: 4.68 X10 (3) UL (ref 1.5–7.7)
NEUTROPHILS # BLD AUTO: 4.68 X10(3) UL (ref 1.5–7.7)
NEUTROPHILS NFR BLD AUTO: 66.5 %
OSMOLALITY SERPL CALC.SUM OF ELEC: 285 MOSM/KG (ref 275–295)
PLATELET # BLD AUTO: 315 10(3)UL (ref 150–450)
POTASSIUM SERPL-SCNC: 4.2 MMOL/L (ref 3.5–5.1)
RBC # BLD AUTO: 3.99 X10(6)UL (ref 3.8–5.3)
SODIUM SERPL-SCNC: 139 MMOL/L (ref 136–145)
WBC # BLD AUTO: 7 X10(3) UL (ref 4–11)

## 2020-08-25 PROCEDURE — 99232 SBSQ HOSP IP/OBS MODERATE 35: CPT | Performed by: STUDENT IN AN ORGANIZED HEALTH CARE EDUCATION/TRAINING PROGRAM

## 2020-08-25 RX ORDER — HYDROMORPHONE HYDROCHLORIDE 1 MG/ML
0.5 INJECTION, SOLUTION INTRAMUSCULAR; INTRAVENOUS; SUBCUTANEOUS EVERY 2 HOUR PRN
Status: DISCONTINUED | OUTPATIENT
Start: 2020-08-25 | End: 2020-08-28

## 2020-08-25 RX ORDER — ALPRAZOLAM 0.25 MG/1
0.25 TABLET ORAL 2 TIMES DAILY PRN
Status: DISCONTINUED | OUTPATIENT
Start: 2020-08-25 | End: 2020-08-28

## 2020-08-25 NOTE — PLAN OF CARE
Pt Aox4. RA. VSS. Up ad lora. Pt having pain in drain site. Pain meds given per MAR. Site clean dry and intact. Bulb flushed and aspirated. Bright red output. IVF and antibiotics infusing. Pt voiding. Call light in reach. Will continue to monitor.

## 2020-08-25 NOTE — PROGRESS NOTES
Patient declined picc line at this time states she cant go thru that. Attempted to talk to patient about picc she declined to change her mind at this time patient  at bedside states there has to be another way. Dr Kapil Marroquin was made aware.

## 2020-08-25 NOTE — CM/SW NOTE
MSW acknowledged order to check benefits for IV abx. MSW called [facility] at [phone] and spoke with Ana Lilia Gan. Clinical packet faxed to [phone]. 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 SW will follow up tomorrow.   Awaiting final order

## 2020-08-25 NOTE — PROGRESS NOTES
Patient c/o lot of pain and anxiety states that she feels like she has a temp skin cool to the touch temp 97.8 patient shaking .  Patient states that she had pain all night and about 3am it was the worst.bulb drain intact to rt flank area noted scant amount

## 2020-08-25 NOTE — VASCULAR ACCESS
Consulted to place a PICC line. Risks and benefits of PICC line discussed with pt and family at bedside. Pt refused PICC line at this time.  Will notify ordering MD and primary RN of pt's refusal.
Pt has consult for vascular access, requesting a midline. ID not clearing for a midline at this time d/t blood culture pending. Pt has adequate PIV access, Philip Lacey RN aware that consult order will be discontinued.   ID will order necessary line when cultu
Denies the following: constitutional symptoms, visual symptoms, cardiovascular symptoms, respiratory symptoms, GI symptoms, musculoskeletal symptoms, skin symptoms, neurologic symptoms, hematologic symptoms, allergic symptoms, psychiatric symptoms  Except any pertinent positives listed.

## 2020-08-25 NOTE — PROGRESS NOTES
Called to patient room that she was having a lot of pain from rt drain site dressing to area dry and intact. Noted small amount of drainage in tubing    [facility] service called to give condition report.

## 2020-08-25 NOTE — CONSULTS
[address] [place] [address] Patient Status:  Inpatient    10/17/1995 MRN [id number]   [facility] 4NW-A Attending Lorin Ibarra MD   [facility] Day # 3 PCP Rene Garcia DO MG/0.4ML injection 40 mg, 40 mg, Subcutaneous, Nightly  •  temazepam (RESTORIL) cap 7.5 mg, 7.5 mg, Oral, Nightly PRN  No current facility-administered medications on file prior to encounter.    Ondansetron HCl (ZOFRAN) 4 mg tablet, Take 1 tablet (4 mg tota Microbiologic Data:   Hospital Encounter on 08/22/20   1.  AEROBIC BACTERIAL CULTURE     Status: Abnormal (Preliminary result)    Collection Time: 08/24/20  2:04 PM   Result Value Ref Range    Aerobic Culture Result 4+ growth Escherichia coli (A) N/A

## 2020-08-25 NOTE — PROGRESS NOTES
[facility]  Progress Note     Aibsai Leggett Patient Status:  Inpatient    10/17/1995 MRN [id number]   [facility] 4NW-A Attending Viviane Buitrago MD   Hosp Day # 3 PCP Shy Alaniz DO     Chief Complaint: REnal abscess     S: Patient (based on SCr of 0.63 mg/dL). Recent Labs   Lab 08/24/20  0759   PTP 14.7*   INR 1.12*       No results for input(s): TROP, CK in the last 168 hours. Imaging: Imaging data reviewed in Epic.     Medications:   • meropenem  500 mg Intravenous Q8H

## 2020-08-25 NOTE — IMAGING NOTE
Renal abscess s/p perc drain and aspiration to dryness. 5 ml output since placement yesterday. Blood tinged cloudy fluid. Initial fluid thick green. May not have much more output given small size.   If outputs cont to be low will obtain CT abscessogram.

## 2020-08-25 NOTE — PAYOR COMM NOTE
--------------  CONTINUED STAY REVIEW    Payor: Nya Lopez  #:  [id number]  Authorization Number: [id number]    Admit date: 8/22/20  Admit time: 49504 Alejandra Kuo    Admitting Physician: Mena Lock MD  Attending Physician:  Taylor Celaya MD  Prim morphINE sulfate (PF) 4 MG/ML injection 2 mg     Date Action Dose Route User    8/25/2020 0801 Given 4 mg Intravenous Wayne Muniz RN    8/25/2020 0320 Given 2 mg Intravenous Bebe Kahn RN    8/24/2020 2045 Given 2 mg Intravenous Gasior, V • Diabetes Father     • Diabetes Maternal Grandfather         reports that she has never smoked.  She has never used smokeless tobacco. She reports that she does not drink alcohol or use drugs.     Allergies:  No Known Allergies     Medications:     Current Respiratory: Clear to auscultation bilaterally. No wheezes. No rhonchi. Cardiovascular: S1, S2.  Regular rate and rhythm. No murmurs. Abdomen: Soft, nontender, nondistended. Positive bowel sounds.   Right flank tender drain in place  Musculoskeletal: F Drain placed on 8/25, co right flank pain, output 5 cc  Continued fever, right flank tender  Cont meropenem  Repeat CT if no output from drain  PICC for home ivabx           Ro Brewer MD  [facility]  [phone]      El

## 2020-08-26 LAB
ALBUMIN SERPL-MCNC: 2.6 G/DL (ref 3.4–5)
ALBUMIN/GLOB SERPL: 0.6 {RATIO} (ref 1–2)
ALP LIVER SERPL-CCNC: 94 U/L (ref 37–98)
ALT SERPL-CCNC: 25 U/L (ref 13–56)
ANION GAP SERPL CALC-SCNC: 5 MMOL/L (ref 0–18)
AST SERPL-CCNC: 11 U/L (ref 15–37)
BILIRUB SERPL-MCNC: 0.2 MG/DL (ref 0.1–2)
BILIRUB UR QL STRIP.AUTO: NEGATIVE
BUN BLD-MCNC: 4 MG/DL (ref 7–18)
BUN/CREAT SERPL: 8.2 (ref 10–20)
CALCIUM BLD-MCNC: 8.8 MG/DL (ref 8.5–10.1)
CHLORIDE SERPL-SCNC: 102 MMOL/L (ref 98–112)
CLARITY UR REFRACT.AUTO: CLEAR
CO2 SERPL-SCNC: 28 MMOL/L (ref 21–32)
CREAT BLD-MCNC: 0.49 MG/DL (ref 0.55–1.02)
GLOBULIN PLAS-MCNC: 4.4 G/DL (ref 2.8–4.4)
GLUCOSE BLD-MCNC: 106 MG/DL (ref 70–99)
GLUCOSE UR STRIP.AUTO-MCNC: NEGATIVE MG/DL
KETONES UR STRIP.AUTO-MCNC: NEGATIVE MG/DL
LEUKOCYTE ESTERASE UR QL STRIP.AUTO: NEGATIVE
M PROTEIN MFR SERPL ELPH: 7 G/DL (ref 6.4–8.2)
NITRITE UR QL STRIP.AUTO: NEGATIVE
OSMOLALITY SERPL CALC.SUM OF ELEC: 277 MOSM/KG (ref 275–295)
PH UR STRIP.AUTO: 9 [PH] (ref 4.5–8)
POTASSIUM SERPL-SCNC: 4 MMOL/L (ref 3.5–5.1)
PROT UR STRIP.AUTO-MCNC: NEGATIVE MG/DL
RBC #/AREA URNS AUTO: >10 /HPF
SODIUM SERPL-SCNC: 135 MMOL/L (ref 136–145)
SP GR UR STRIP.AUTO: 1.01 (ref 1–1.03)
UROBILINOGEN UR STRIP.AUTO-MCNC: <2 MG/DL

## 2020-08-26 PROCEDURE — 99232 SBSQ HOSP IP/OBS MODERATE 35: CPT | Performed by: INTERNAL MEDICINE

## 2020-08-26 NOTE — PLAN OF CARE
Patient's spouse here early very anxious & frustrated & so is patient of what's going on. They feel that they are not informed of patient's true condition & patient is not having any improvement from when she first got here.  RN explained about the tests &

## 2020-08-26 NOTE — PLAN OF CARE
Pt fatigued, feeling dizzy and stressed with news of PICC line today.  at bedside. Parents came to visit and calm pt down. Pt expressing no one is listening to her. Not wanting another line in her. C/o pain at drain site.  Site clean, dry and in

## 2020-08-26 NOTE — PROGRESS NOTES
INFECTIOUS DISEASE PROGRESS NOTE    Hiba Hamad Patient Status:  Inpatient    10/17/1995 MRN [id number]   [facility] 4NW-A Attending Melvi Urrutia MD   [address] Day # 4 PCP Ania Estrada, DO No acute distress. Alert and oriented x 3. Tearful. Vital signs: Temp:  [98.6 °F (37 °C)-103.1 °F (39.5 °C)] 102.6 °F (39.2 °C)  Pulse:  [] 123  Resp:  [18-20] 18  BP: ()/(52-67) 121/67  HEENT: Moist mucous membranes.  Extraocular muscles are i available)     Amikacin <=2 Sensitive      Cefazolin >=64 Resistant      Cefepime  Resistant      Ceftazidime  Resistant      Ceftriaxone >=64 Resistant      Ciprofloxacin >=4 Resistant      Gentamicin >=16 Resistant      Meropenem <=0.25 Sensitive      Le still feel that she should have a line placed for IV abx outpatient as that is the recommended treatment for this infection. Despite this, she still refuses. She is anxious and tearful regarding the PICC/midline. Emotional support provided.  I explained dash

## 2020-08-26 NOTE — CM/SW NOTE
Ministerio received call from [place]evin [place] at [facility]. Shavonne Maya does not pay for teach and train for IV abs but will pay for [facility] vs daily OP for Iv Bs. Ministerio met with pt. She has been refusing PICC line.  MINISTERIO explained that [facility] and OP [facility] would require PICC line to infuse IV

## 2020-08-26 NOTE — PLAN OF CARE
Resting in bed, remains anxious & restless. Still stressing over PICC line. She is firm that she is not gonna have PICC placed. Spouse had asked patient's parents to talk to her into it. Declined pain medication & antiemetic at this time.  Output from drain wa

## 2020-08-26 NOTE — PROGRESS NOTES
[facility]  Progress Note     Hiba Oleksandr Patient Status:  Inpatient    10/17/1995 MRN [id number]   [facility] 4NW-A Attending Viviane Buitrago MD   Hosp Day # 4 PCP Shy Alaniz DO     Chief Complaint: REnal abscess     S: febrile --   --    TP 8.4*  --   --        Estimated Creatinine Clearance: 118.3 mL/min (based on SCr of 0.58 mg/dL). Recent Labs   Lab 08/24/20  0759   PTP 14.7*   INR 1.12*       No results for input(s): TROP, CK in the last 168 hours.          Imaging: Imagin

## 2020-08-26 NOTE — PROGRESS NOTES
[facility]  Progress Note      Abisai Leggett Patient Status:  Inpatient    10/17/1995 MRN [id number]   [facility] 4NW-A Attending Comfort Modi DO   Hosp Day # 4 PCP Damir Dorsey DO       25year-old female with right renal abscess s

## 2020-08-27 ENCOUNTER — APPOINTMENT (OUTPATIENT)
Dept: CT IMAGING | Facility: HOSPITAL | Age: 25
DRG: 690 | End: 2020-08-27
Attending: RADIOLOGY
Payer: COMMERCIAL

## 2020-08-27 ENCOUNTER — TELEPHONE (OUTPATIENT)
Dept: FAMILY MEDICINE CLINIC | Facility: CLINIC | Age: 25
End: 2020-08-27

## 2020-08-27 LAB
BASOPHILS # BLD AUTO: 0.02 X10(3) UL (ref 0–0.2)
BASOPHILS NFR BLD AUTO: 0.3 %
DEPRECATED RDW RBC AUTO: 42.6 FL (ref 35.1–46.3)
EOSINOPHIL # BLD AUTO: 0.23 X10(3) UL (ref 0–0.7)
EOSINOPHIL NFR BLD AUTO: 3.8 %
ERYTHROCYTE [DISTWIDTH] IN BLOOD BY AUTOMATED COUNT: 13 % (ref 11–15)
HCT VFR BLD AUTO: 34.2 % (ref 35–48)
HGB BLD-MCNC: 10.6 G/DL (ref 12–16)
IMM GRANULOCYTES # BLD AUTO: 0.02 X10(3) UL (ref 0–1)
IMM GRANULOCYTES NFR BLD: 0.3 %
LYMPHOCYTES # BLD AUTO: 2.29 X10(3) UL (ref 1–4)
LYMPHOCYTES NFR BLD AUTO: 37.4 %
MCH RBC QN AUTO: 27.7 PG (ref 26–34)
MCHC RBC AUTO-ENTMCNC: 31 G/DL (ref 31–37)
MCV RBC AUTO: 89.3 FL (ref 80–100)
MONOCYTES # BLD AUTO: 0.71 X10(3) UL (ref 0.1–1)
MONOCYTES NFR BLD AUTO: 11.6 %
NEUTROPHILS # BLD AUTO: 2.86 X10 (3) UL (ref 1.5–7.7)
NEUTROPHILS # BLD AUTO: 2.86 X10(3) UL (ref 1.5–7.7)
NEUTROPHILS NFR BLD AUTO: 46.6 %
PLATELET # BLD AUTO: 301 10(3)UL (ref 150–450)
RBC # BLD AUTO: 3.83 X10(6)UL (ref 3.8–5.3)
WBC # BLD AUTO: 6.1 X10(3) UL (ref 4–11)

## 2020-08-27 PROCEDURE — 99232 SBSQ HOSP IP/OBS MODERATE 35: CPT | Performed by: INTERNAL MEDICINE

## 2020-08-27 PROCEDURE — 05HY33Z INSERTION OF INFUSION DEVICE INTO UPPER VEIN, PERCUTANEOUS APPROACH: ICD-10-PCS | Performed by: INTERNAL MEDICINE

## 2020-08-27 PROCEDURE — 49424 ASSESS CYST CONTRAST INJECT: CPT | Performed by: RADIOLOGY

## 2020-08-27 PROCEDURE — 76080 X-RAY EXAM OF FISTULA: CPT | Performed by: RADIOLOGY

## 2020-08-27 RX ORDER — LIDOCAINE HYDROCHLORIDE 10 MG/ML
1 INJECTION, SOLUTION INFILTRATION; PERINEURAL ONCE
Status: DISCONTINUED | OUTPATIENT
Start: 2020-08-27 | End: 2020-08-28

## 2020-08-27 NOTE — PLAN OF CARE
Pt aaox4, complaints of severe pain on drain site, dilaudid given as needed, ida drain removed in IR, pt states pain is  better now after removal; have bouts of nausea, zofran given with relief, midline inserted on right upper arm by vascular RN, maintained Progressing     Problem: METABOLIC/FLUID AND ELECTROLYTES - ADULT  Goal: Electrolytes maintained within normal limits  Description  INTERVENTIONS:  - Monitor labs and rhythm and assess patient for signs and symptoms of electrolyte imbalances  - Administer interventions unsuccessful or patient reports new pain  - Anticipate increased pain with activity and pre-medicate as appropriate  Outcome: Progressing     Problem: GASTROINTESTINAL - ADULT  Goal: Minimal or absence of nausea and vomiting  Description  INT emptying  Outcome: Progressing     Problem: Patient/Family Goals  Goal: Patient/Family Long Term Goal  Description  Patient's Long Term Goal:     Interventions:  -   - See additional Care Plan goals for specific interventions  Outcome: Progressing  Goal: P ordered  - Monitor response to electrolyte replacements, including rhythm and repeat lab results as appropriate  - Fluid restriction as ordered  - Instruct patient on fluid and nutrition restrictions as appropriate  Outcome: Progressing     Problem: HEMATO

## 2020-08-27 NOTE — PAYOR COMM NOTE
--------------  CONTINUED STAY REVIEW    Payor: Nya Lopez  #:  [id number]  Authorization Number: [id number]    Admit date: 8/22/20  Admit time: 1807    Admitting Physician: Christine Bolton MD  Attending Physician:  Bhakti Betancourt DO  Prim 123  Resp:  [18-20] 18  BP: ()/(52-67) 121/67  HEENT: Moist mucous membranes. Extraocular muscles are intact. Neck: No lymphadenopathy. supple, no masses  Respiratory: Clear to auscultation bilaterally. No wheezes. No rhonchi.   Cardiovascular: S1, S Resistant         Ceftazidime   Resistant         Ceftriaxone >=64 Resistant         Ciprofloxacin >=4 Resistant         Gentamicin >=16 Resistant         Meropenem <=0.25 Sensitive         Levofloxacin >=8 Resistant         Piperacillin + Tazobactam <=4 S for IV abx outpatient as that is the recommended treatment for this infection. Despite this, she still refuses. She is anxious and tearful regarding the PICC/midline. Emotional support provided.  I explained that I think the more important thing right now i following, abscessogram planned prior to drain removal  2. Persistent fevers  1. ID following  2. Re-culture  3. Check labs  3. Nausea/dizziness 2/2 above  But also suspect narcotic sideffect, limit as able  4.  Renal Abscess     Plan of care:   As above    89.3   .0 350.0  --  315.0 301.0   INR  --   --  1.12*  --   --                 Recent Labs   Lab 08/22/20  1510 08/23/20  0651 08/25/20  0743 08/26/20  1016   GLU 73  --  88 106*   BUN 5*  --  7 4*   CREATSERUM 0.63  --  0.58 0.49*   GFRAA 145  -- site. Denies any abdominal pain, nausea, vomiting or diarrhea. Denies any dysuria or hematuria. Denies any fevers or chills.     Afebrile > 24 hours  WBC wnl        Review of Systems:  A comprehensive 10 point review of systems was completed.   Pertinent po Day N/A   2. ANAEROBIC CULTURE     Status: None (Preliminary result)     Collection Time: 08/24/20  2:04 PM   Result Value Ref Range     Anaerobic Culture No Anaerobes to date N/A   3.  AEROBIC BACTERIAL CULTURE     Status: Abnormal     Collection Time: 08/ of drain. She is agreeable to midline        ASSESSMENT / PLAN:      1. ESBL UTI/ pyelonephritis complicated by renal abscess s/p IR drain  1. IV antibiotics - after much discussion she is finally agreeable to midline placement - will order  2.  IR followi

## 2020-08-27 NOTE — PROGRESS NOTES
INFECTIOUS DISEASE PROGRESS NOTE    Hiba Hamad Patient Status:  Inpatient    10/17/1995 MRN [id number]   [facility] 4NW-A Attending Devante Lopez MD   [facility] Day # 5 PCP Clem Burger DO [[id number]] 79  Resp:  [18] 18  BP: ()/(62-69) 104/62  HEENT: Moist mucous membranes. Extraocular muscles are intact. Neck: No lymphadenopathy. supple, no masses  Respiratory: Clear to auscultation bilaterally. No wheezes. No rhonchi.   Cardiovascular: method available)     Amikacin <=2 Sensitive      Cefazolin >=64 Resistant      Cefepime  Resistant      Ceftazidime  Resistant      Ceftriaxone >=64 Resistant      Ciprofloxacin >=4 Resistant      Gentamicin >=16 Resistant      Meropenem <=0.25 Sensitive

## 2020-08-27 NOTE — PROGRESS NOTES
[facility]  Progress Note     Abisai Leggett Patient Status:  Inpatient    10/17/1995 MRN [id number]   [facility] 4NW-A Attending Viridiana Minaya MD   Hosp Day # 5 PCP Tsering Arevalo DO     Chief Complaint: REnal abscess     S: afebril ALKPHO 118*  --   --  94   AST 12*  --   --  11*   ALT 31  --   --  25   BILT 0.3  --   --  0.2   TP 8.4*  --   --  7.0       Estimated Creatinine Clearance: 140 mL/min (A) (based on SCr of 0.49 mg/dL (L)).     Recent Labs   Lab 08/24/20  0759   PTP 14.7*

## 2020-08-27 NOTE — TELEPHONE ENCOUNTER
Calling to touch base with Dr. Erica Lawrence about this patient and her current hospitalization stay. Patient will also be receiving IV antibiotics in the home setting via her PIC line. Patient will also need hospital follow up once discharged.

## 2020-08-27 NOTE — PLAN OF CARE
Pt awake and alert.  at bedside. Pt feels frustrated about PICC line. Continues to refuse it. Afebrile. VSS. RA. C/o pain at drain site. Meds given per MAR. Drain flushed and aspirated less than 5 cc out. IV antibiotics infusing.  Pt has poor appet

## 2020-08-28 VITALS
BODY MASS INDEX: 25.4 KG/M2 | HEIGHT: 62 IN | HEART RATE: 63 BPM | TEMPERATURE: 98 F | SYSTOLIC BLOOD PRESSURE: 98 MMHG | RESPIRATION RATE: 16 BRPM | OXYGEN SATURATION: 99 % | DIASTOLIC BLOOD PRESSURE: 53 MMHG | WEIGHT: 138 LBS

## 2020-08-28 PROCEDURE — 99239 HOSP IP/OBS DSCHRG MGMT >30: CPT | Performed by: INTERNAL MEDICINE

## 2020-08-28 RX ORDER — ONDANSETRON 8 MG/1
8 TABLET, ORALLY DISINTEGRATING ORAL EVERY 8 HOURS PRN
Qty: 21 TABLET | Refills: 0 | Status: SHIPPED | OUTPATIENT
Start: 2020-08-28 | End: 2020-09-04

## 2020-08-28 RX ORDER — ONDANSETRON 4 MG/1
4 TABLET, FILM COATED ORAL EVERY 8 HOURS PRN
Qty: 15 TABLET | Refills: 0 | Status: SHIPPED | OUTPATIENT
Start: 2020-08-28 | End: 2020-09-28

## 2020-08-28 NOTE — PROGRESS NOTES
INFECTIOUS DISEASE PROGRESS NOTE    Hiba Hamad Patient Status:  Inpatient    10/17/1995 MRN [id number]   [facility] 4NW-A Attending Birgit Day MD   [facility] Day # 6 PCP Eulalio Carter DO 16  BP: ()/(53-67) 98/53  HEENT: Moist mucous membranes. Extraocular muscles are intact. Neck: No lymphadenopathy. supple, no masses  Respiratory: Clear to auscultation bilaterally. No wheezes. No rhonchi.   Cardiovascular: S1, S2.  Regular rate and Amikacin <=2 Sensitive      Cefazolin >=64 Resistant      Cefepime  Resistant      Ceftazidime  Resistant      Ceftriaxone >=64 Resistant      Ciprofloxacin >=4 Resistant      Gentamicin >=16 Resistant      Meropenem <=0.25 Sensitive      Levofloxacin >=8 extracapsular component is identified. There is no hydronephrosis seen. No proximal hydroureter. Visualized bowel appears unremarkable. Post contrast injection images demonstrate contrast opacifying a collapsed cavity   consistent with successful drainage. selina and Dr. Su Bailey. Altamese Jesus Arzola PA-C    ID ATTENDING ADDENDUM     Pt seen an examined independently. Chart reviewed. Agree with above. Note has been reviewed by me and modified as needed. Exam and Impression/ Recs as noted above.   D/w sta

## 2020-08-28 NOTE — PLAN OF CARE
Assumed care of patient at approx 2200 following RN shift report. Patient alert and oriented; no c/o pain. Patient c/o anxiety regarding being in the hospital and being ill; emotional support provided. Vital signs stable, patient afebrile this shift.  Owen

## 2020-08-28 NOTE — CM/SW NOTE
08/28/20 1400   Discharge disposition   Expected discharge disposition Home or Self   Name of Facillity/Home Care/Hospice [address] Yeh has already coordinated w/[facility]. They will be able to service pt tomorrow.   updated and agreeable to

## 2020-08-28 NOTE — PROGRESS NOTES
NURSING DISCHARGE NOTE    Discharged Home via Wheelchair. Accompanied by Spouse and Support staff  Belongings Taken by patient/family.   Pt aaox4, right upper arm midline in place, flushed accordingly, dc instructions given to spouse/pt, verbalized und

## 2020-08-28 NOTE — CM/SW NOTE
Spoke w/pt and pt's . They are not wanting to do in office infusion.  would like the pt to get home health at [place]. Will ask [facility] to get in contact w/[facility].

## 2020-08-29 NOTE — DISCHARGE SUMMARY
[facility]  DISCHARGE SUMMARY     Abisai Leggett Patient Status:  Inpatient    10/17/1995 MRN [id number]   [facility] 4NW-A Attending No att. providers found   Hosp Day # 6 PCP Charity Arellano DO     Date of Admission: 2020  Date Instructions Prescription details   ertapenem 1 g 1 g in sodium chloride 0.9% 100 mL      Inject 1 g into the vein daily for 14 days. Will need weekly labs with CBC, BMP and CRP.    Stop taking on:  September 12, 2020  Quantity:  14 Bag  Refills:  0     ond minutes

## 2020-08-31 ENCOUNTER — PATIENT OUTREACH (OUTPATIENT)
Dept: CASE MANAGEMENT | Age: 25
End: 2020-08-31

## 2020-08-31 ENCOUNTER — TELEPHONE (OUTPATIENT)
Dept: FAMILY MEDICINE CLINIC | Facility: CLINIC | Age: 25
End: 2020-08-31

## 2020-08-31 DIAGNOSIS — Z02.9 ENCOUNTERS FOR UNSPECIFIED ADMINISTRATIVE PURPOSE: ICD-10-CM

## 2020-08-31 DIAGNOSIS — N12 PYELONEPHRITIS: ICD-10-CM

## 2020-08-31 PROCEDURE — 1111F DSCHRG MED/CURRENT MED MERGE: CPT

## 2020-08-31 NOTE — PROGRESS NOTES
Initial Post Discharge Follow Up   Discharge Date: 8/28/20  Contact Date: 8/31/2020    Consent Verification:  Assessment Completed With: Patient  HIPAA Verified?   Yes    Discharge Dx:     ESBL UTI  Sepsis  Pyelonephritis  Renal abscess  Nausea  Dizzines (ZOFRAN) 4 mg tablet Take 1 tablet (4 mg total) by mouth every 8 (eight) hours as needed for Nausea. 15 tablet 0   • ondansetron 8 MG Oral Tablet Dispersible Take 1 tablet (8 mg total) by mouth every 8 (eight) hours as needed for Nausea.  21 tablet 0     • another call. TE will be sent to d/c scheduling team to call pt back to assist. Pt denies any barriers to keeping/getting to HFU appts.        PCP TCM/HFU appointment: scheduled at D/C within 7-14 days  no     NCM Reviewed/scheduled/rescheduled PCP TCM/HFU

## 2020-08-31 NOTE — PROGRESS NOTES
Patient has a follow up appt order needing to be scheduled. Pt was advised to see Dr. Clemens Shone- ID in two weeks. Pt requested assistance scheduling with Dr. Clemens Shone.     Pt is aware she will get a call back to schedule from d/c scheduling team.     Thank you

## 2020-08-31 NOTE — PAYOR COMM NOTE
--------------  DISCHARGE REVIEW    Payor: Nya John  #:  [id number]  Authorization Number: [id number]    Admit date: 8/22/20  Admit time:  1807  Discharge Date: 8/28/2020  3:08 PM         Date of Admission: 8/22/2020  Date of [address]

## 2020-08-31 NOTE — TELEPHONE ENCOUNTER
Spoke to pt for TCM today. Pt does not have HFU appt scheduled at this time. Attempted to offer an appt however pt stated declined stating she would like to FU with ID only for now.  TCM/HFU appt recommended by 9/11/20 as pt is a moderate risk for readmiss

## 2020-09-02 ENCOUNTER — TELEPHONE (OUTPATIENT)
Dept: FAMILY MEDICINE CLINIC | Facility: CLINIC | Age: 25
End: 2020-09-02

## 2020-09-03 NOTE — TELEPHONE ENCOUNTER
Patient states that she has an appt with Dr. Prachi Julio on 9-, and will schedule an appt with Dr. Keya Colin after she sees Dr. Prachi Julio. Patient does not wish to schedule an appt with Dr. Keya Colin at this time.

## 2020-09-18 NOTE — TELEPHONE ENCOUNTER
Susie  for patient calling to follow up, will like to know if doctor needs any assistance with patient's care. Case pending to be closed if does not hear back from doctor/office.

## 2020-09-28 ENCOUNTER — OFFICE VISIT (OUTPATIENT)
Dept: FAMILY MEDICINE CLINIC | Facility: CLINIC | Age: 25
End: 2020-09-28
Payer: COMMERCIAL

## 2020-09-28 VITALS
HEIGHT: 62 IN | BODY MASS INDEX: 25.76 KG/M2 | DIASTOLIC BLOOD PRESSURE: 68 MMHG | RESPIRATION RATE: 16 BRPM | HEART RATE: 74 BPM | WEIGHT: 140 LBS | SYSTOLIC BLOOD PRESSURE: 100 MMHG | TEMPERATURE: 98 F | OXYGEN SATURATION: 98 %

## 2020-09-28 DIAGNOSIS — Z00.00 WELL ADULT EXAM: Primary | ICD-10-CM

## 2020-09-28 DIAGNOSIS — L30.9 ECZEMA OF BOTH UPPER EXTREMITIES: ICD-10-CM

## 2020-09-28 DIAGNOSIS — F41.1 GAD (GENERALIZED ANXIETY DISORDER): ICD-10-CM

## 2020-09-28 DIAGNOSIS — Z28.21 REFUSED INFLUENZA VACCINE: ICD-10-CM

## 2020-09-28 DIAGNOSIS — R11.0 NAUSEA: ICD-10-CM

## 2020-09-28 PROCEDURE — 3078F DIAST BP <80 MM HG: CPT | Performed by: FAMILY MEDICINE

## 2020-09-28 PROCEDURE — 3074F SYST BP LT 130 MM HG: CPT | Performed by: FAMILY MEDICINE

## 2020-09-28 PROCEDURE — 99213 OFFICE O/P EST LOW 20 MIN: CPT | Performed by: FAMILY MEDICINE

## 2020-09-28 PROCEDURE — 3008F BODY MASS INDEX DOCD: CPT | Performed by: FAMILY MEDICINE

## 2020-09-28 PROCEDURE — 99395 PREV VISIT EST AGE 18-39: CPT | Performed by: FAMILY MEDICINE

## 2020-09-28 RX ORDER — ESCITALOPRAM OXALATE 10 MG/1
10 TABLET ORAL DAILY
Qty: 30 TABLET | Refills: 0 | Status: SHIPPED | OUTPATIENT
Start: 2020-09-28 | End: 2020-10-28

## 2020-09-28 RX ORDER — ONDANSETRON 4 MG/1
4 TABLET, FILM COATED ORAL EVERY 8 HOURS PRN
Qty: 15 TABLET | Refills: 0 | Status: SHIPPED | OUTPATIENT
Start: 2020-09-28 | End: 2020-11-10 | Stop reason: ALTCHOICE

## 2020-09-28 NOTE — PROGRESS NOTES
Patient presents with: Follow - Up: ache on upper back where tube was placed when she was in ER for UTI back in August  Immunization/Injection: refused influenza      HPI:  Here for follow-up.   Approximately 1 month ago she was diagnosed with ESBL UTI/santhosh Constitutional: Negative for fever, chills and fatigue. No distress. HENT: Negative for hearing loss, congestion, sore throat, neck pain and dental problem. Eyes: Negative for pain and visual disturbance.    Respiratory: Negative for cough, chest tightn • hydrocortisone 2.5 % External Cream Apply twice a day to affected area. Do not use for more than 7-10 days in a row. 20 g 0   • escitalopram 10 MG Oral Tab Take 1 tablet (10 mg total) by mouth daily.  30 tablet 0   • Ondansetron HCl (ZOFRAN) 4 mg tablet Skin: Patient's upper back hyperpigmented with dry flaky skin. On both of her wrist there are red erythematous pinpoint dots. There are no other skin lesions. Psychiatric: Normal mood and affect.      A/P:    Refused influenza vaccine  Well adult exam  ( Meds & Refills for this Visit:  Requested Prescriptions     Signed Prescriptions Disp Refills   • hydrocortisone 2.5 % External Cream 20 g 0     Sig: Apply twice a day to affected area. Do not use for more than 7-10 days in a row.    • escitalopram 10 MG O Cervical cancer Women ages 24 and older  Women between ages 24 and 34 should have a Pap test every 3 years; women between ages 27 and 72 are advised to have a Pap test plus an HPV test every 5 years    Chlamydia Sexually active women ages 25 and younger, a Haemophilus influenzae  Type B (HIB)  Women at increased risk for infection should talk with their healthcare provider  1 to 3 doses   Human papillomavirus (HPV)  All women in this age group up to age 32  3 doses; the second dose should be given 1 to 2 mon 1 According to the ACS, women ages 21 to 44 years should have a clinical breast exam (CBE) as part of their routine health exam every 3 years. Breast self-exams are an option for women starting in their 25s.  But the U.S. Preventive Services Task Force (USP

## 2020-11-10 ENCOUNTER — OFFICE VISIT (OUTPATIENT)
Dept: FAMILY MEDICINE CLINIC | Facility: CLINIC | Age: 25
End: 2020-11-10
Payer: COMMERCIAL

## 2020-11-10 VITALS
TEMPERATURE: 98 F | RESPIRATION RATE: 16 BRPM | BODY MASS INDEX: 26.31 KG/M2 | OXYGEN SATURATION: 99 % | HEART RATE: 90 BPM | WEIGHT: 143 LBS | HEIGHT: 62 IN | SYSTOLIC BLOOD PRESSURE: 120 MMHG | DIASTOLIC BLOOD PRESSURE: 72 MMHG

## 2020-11-10 DIAGNOSIS — M53.3 CHRONIC RIGHT SI JOINT PAIN: Primary | ICD-10-CM

## 2020-11-10 DIAGNOSIS — M54.50 ACUTE LOW BACK PAIN WITHOUT SCIATICA, UNSPECIFIED BACK PAIN LATERALITY: ICD-10-CM

## 2020-11-10 DIAGNOSIS — G89.29 CHRONIC RIGHT SI JOINT PAIN: Primary | ICD-10-CM

## 2020-11-10 DIAGNOSIS — R11.0 NAUSEA: ICD-10-CM

## 2020-11-10 DIAGNOSIS — Z28.21 REFUSED INFLUENZA VACCINE: ICD-10-CM

## 2020-11-10 PROCEDURE — 3078F DIAST BP <80 MM HG: CPT | Performed by: FAMILY MEDICINE

## 2020-11-10 PROCEDURE — 3008F BODY MASS INDEX DOCD: CPT | Performed by: FAMILY MEDICINE

## 2020-11-10 PROCEDURE — 99213 OFFICE O/P EST LOW 20 MIN: CPT | Performed by: FAMILY MEDICINE

## 2020-11-10 PROCEDURE — 3074F SYST BP LT 130 MM HG: CPT | Performed by: FAMILY MEDICINE

## 2020-11-10 PROCEDURE — 81003 URINALYSIS AUTO W/O SCOPE: CPT | Performed by: FAMILY MEDICINE

## 2020-11-10 RX ORDER — TIZANIDINE HYDROCHLORIDE 4 MG/1
4 CAPSULE, GELATIN COATED ORAL NIGHTLY PRN
Qty: 30 CAPSULE | Refills: 0 | Status: SHIPPED | OUTPATIENT
Start: 2020-11-10 | End: 2020-11-20

## 2020-11-10 RX ORDER — NAPROXEN 500 MG/1
500 TABLET ORAL 2 TIMES DAILY WITH MEALS
Qty: 30 TABLET | Refills: 0 | Status: SHIPPED | OUTPATIENT
Start: 2020-11-10 | End: 2020-11-25

## 2020-11-10 NOTE — PATIENT INSTRUCTIONS
Understanding Sacroiliac Strain    A joint is a place where 2 bones meet. The 2 sacroiliac joints are where the hip (iliac) bones meet the bottom part of the spine (sacrum). These joints are surrounded by muscle, connective tissue, and nerves.  Normally, Possible complications of sacroiliac strain  If the cause of the pain is not addressed, symptoms may return or get worse. Follow your healthcare provider’s instructions on lifestyle changes and treating your SIJ strain.    When to call your healthcare provi

## 2020-11-10 NOTE — PROGRESS NOTES
Patient presents with:  Pain: right side pain that will travel to the left side x 3-4 days. HPI:  Danisha Clay is a 22year old female who presents for evaluation and management of a chief complaint of  back pain. Onset gradual starting 5 days ago.  The Migraine without aura and without status migrainosus, not intractable     Pyelonephritis     Renal abscess      Past Medical History:   Diagnosis Date   • Migraine without aura and without status migrainosus, not intractable 1/11/2020     Past Surgical Constitutional: Oriented to person, place, and time. No distress. HEENT:  Normocephalic and atraumatic. Hearing and tympanic membranes normal.  Nose normal. Oropharynx is clear and moist.   Eyes: Conjunctivae and EOM are normal. PERRLA.  No scleral icteru - tiZANidine HCl 4 MG Oral Cap; Take 1 capsule (4 mg total) by mouth nightly as needed for Muscle spasms. Dispense: 30 capsule; Refill: 0  - CHIROPRACTIC  - INTERNAL    3. Chronic right SI joint pain  - naproxen 500 MG Oral Tab;  Take 1 tablet (500 mg tota A joint is a place where 2 bones meet. The 2 sacroiliac joints are where the hip (iliac) bones meet the bottom part of the spine (sacrum). These joints are surrounded by muscle, connective tissue, and nerves.  Normally, a sacroiliac joint (SIJ) doesn't move If the cause of the pain is not addressed, symptoms may return or get worse. Follow your healthcare provider’s instructions on lifestyle changes and treating your SIJ strain.    When to call your healthcare provider  Call your healthcare provider right away

## 2020-11-11 ENCOUNTER — LAB ENCOUNTER (OUTPATIENT)
Dept: LAB | Age: 25
End: 2020-11-11
Attending: FAMILY MEDICINE
Payer: COMMERCIAL

## 2020-11-11 DIAGNOSIS — Z00.00 WELL ADULT EXAM: ICD-10-CM

## 2020-11-11 PROCEDURE — 80053 COMPREHEN METABOLIC PANEL: CPT

## 2020-11-11 PROCEDURE — 80061 LIPID PANEL: CPT

## 2020-11-11 PROCEDURE — 82306 VITAMIN D 25 HYDROXY: CPT

## 2020-11-11 PROCEDURE — 36415 COLL VENOUS BLD VENIPUNCTURE: CPT

## 2020-11-11 PROCEDURE — 84443 ASSAY THYROID STIM HORMONE: CPT

## 2020-11-11 PROCEDURE — 85025 COMPLETE CBC W/AUTO DIFF WBC: CPT

## 2020-11-11 RX ORDER — ONDANSETRON 4 MG/1
TABLET, FILM COATED ORAL
Qty: 15 TABLET | Refills: 0 | Status: SHIPPED | OUTPATIENT
Start: 2020-11-11 | End: 2021-03-18

## 2020-11-11 RX ORDER — ONDANSETRON 4 MG/1
4 TABLET, FILM COATED ORAL EVERY 8 HOURS PRN
Qty: 15 TABLET | Refills: 0 | Status: SHIPPED | OUTPATIENT
Start: 2020-11-11 | End: 2021-03-18 | Stop reason: ALTCHOICE

## 2020-11-13 PROBLEM — R79.89 HIGH SERUM LOW DENSITY LIPOPROTEIN (LDL) CHOLESTEROL: Status: ACTIVE | Noted: 2020-11-13

## 2020-11-13 PROBLEM — E78.1 HIGH TRIGLYCERIDES: Status: ACTIVE | Noted: 2020-11-13

## 2020-11-13 PROBLEM — E55.9 HYPOVITAMINOSIS D: Status: ACTIVE | Noted: 2020-11-13

## 2020-11-23 DIAGNOSIS — R79.89 LOW VITAMIN D LEVEL: Primary | ICD-10-CM

## 2020-11-23 RX ORDER — ERGOCALCIFEROL 1.25 MG/1
50000 CAPSULE ORAL WEEKLY
Qty: 12 CAPSULE | Refills: 0 | Status: SHIPPED | OUTPATIENT
Start: 2020-11-23 | End: 2021-02-21

## 2021-02-11 RX ORDER — ERGOCALCIFEROL 1.25 MG/1
CAPSULE ORAL
Qty: 12 CAPSULE | Refills: 0 | OUTPATIENT
Start: 2021-02-11

## 2021-02-11 NOTE — TELEPHONE ENCOUNTER
Requested Prescriptions     Name from pharmacy: VITAMIN D2 50,000IU ([address]) CAP RX         Will file in chart as: ERGOCALCIFEROL 1.25 MG (89395 UT) Oral Cap    Sig: TAKE 1 CAPSULE BY MOUTH 1 TIME A WEEK    Disp:  12 capsule    Refills:  0 (Pharmacy requeste

## 2021-03-17 ENCOUNTER — TELEPHONE (OUTPATIENT)
Dept: FAMILY MEDICINE CLINIC | Facility: CLINIC | Age: 26
End: 2021-03-17

## 2021-03-17 NOTE — TELEPHONE ENCOUNTER
Future Appointments   Date Time Provider Tania Escalantei   3/18/2021 11:45 AM Luan Haque, DO [facility] [address] [facility] [address]     Patient scheduled OV for 3-18, patient experiencing dizzy spells. Patient took at home pregnancy test-negative.  Please advise if ok

## 2021-03-18 ENCOUNTER — LAB ENCOUNTER (OUTPATIENT)
Dept: LAB | Age: 26
End: 2021-03-18
Attending: FAMILY MEDICINE
Payer: COMMERCIAL

## 2021-03-18 ENCOUNTER — OFFICE VISIT (OUTPATIENT)
Dept: FAMILY MEDICINE CLINIC | Facility: CLINIC | Age: 26
End: 2021-03-18
Payer: COMMERCIAL

## 2021-03-18 VITALS
OXYGEN SATURATION: 99 % | SYSTOLIC BLOOD PRESSURE: 118 MMHG | WEIGHT: 147 LBS | TEMPERATURE: 98 F | DIASTOLIC BLOOD PRESSURE: 74 MMHG | HEIGHT: 62 IN | HEART RATE: 76 BPM | BODY MASS INDEX: 27.05 KG/M2 | RESPIRATION RATE: 16 BRPM

## 2021-03-18 DIAGNOSIS — N92.6 MISSED MENSES: Primary | ICD-10-CM

## 2021-03-18 DIAGNOSIS — R53.83 FATIGUE, UNSPECIFIED TYPE: ICD-10-CM

## 2021-03-18 DIAGNOSIS — N92.6 MISSED MENSES: ICD-10-CM

## 2021-03-18 DIAGNOSIS — F41.1 GAD (GENERALIZED ANXIETY DISORDER): ICD-10-CM

## 2021-03-18 LAB
BASOPHILS # BLD AUTO: 0.04 X10(3) UL (ref 0–0.2)
BASOPHILS NFR BLD AUTO: 0.5 %
DEPRECATED RDW RBC AUTO: 39.1 FL (ref 35.1–46.3)
EOSINOPHIL # BLD AUTO: 0.18 X10(3) UL (ref 0–0.7)
EOSINOPHIL NFR BLD AUTO: 2 %
ERYTHROCYTE [DISTWIDTH] IN BLOOD BY AUTOMATED COUNT: 12.8 % (ref 11–15)
HCT VFR BLD AUTO: 41.7 %
HGB BLD-MCNC: 13.8 G/DL
IMM GRANULOCYTES # BLD AUTO: 0.01 X10(3) UL (ref 0–1)
IMM GRANULOCYTES NFR BLD: 0.1 %
LYMPHOCYTES # BLD AUTO: 4.25 X10(3) UL (ref 1–4)
LYMPHOCYTES NFR BLD AUTO: 48.1 %
MCH RBC QN AUTO: 28.2 PG (ref 26–34)
MCHC RBC AUTO-ENTMCNC: 33.1 G/DL (ref 31–37)
MCV RBC AUTO: 85.1 FL
MONOCYTES # BLD AUTO: 0.59 X10(3) UL (ref 0.1–1)
MONOCYTES NFR BLD AUTO: 6.7 %
NEUTROPHILS # BLD AUTO: 3.76 X10 (3) UL (ref 1.5–7.7)
NEUTROPHILS # BLD AUTO: 3.76 X10(3) UL (ref 1.5–7.7)
NEUTROPHILS NFR BLD AUTO: 42.6 %
PLATELET # BLD AUTO: 249 10(3)UL (ref 150–450)
POCT URINE PREGNANCY: NEGATIVE
PROCEDURE CONTROL: YES
RBC # BLD AUTO: 4.9 X10(6)UL
T4 FREE SERPL-MCNC: 1 NG/DL (ref 0.8–1.7)
TSI SER-ACNC: 1.28 MIU/ML (ref 0.36–3.74)
VIT D+METAB SERPL-MCNC: 35.4 NG/ML (ref 30–100)
WBC # BLD AUTO: 8.8 X10(3) UL (ref 4–11)

## 2021-03-18 PROCEDURE — 99214 OFFICE O/P EST MOD 30 MIN: CPT | Performed by: FAMILY MEDICINE

## 2021-03-18 PROCEDURE — 85025 COMPLETE CBC W/AUTO DIFF WBC: CPT

## 2021-03-18 PROCEDURE — 84443 ASSAY THYROID STIM HORMONE: CPT

## 2021-03-18 PROCEDURE — 3078F DIAST BP <80 MM HG: CPT | Performed by: FAMILY MEDICINE

## 2021-03-18 PROCEDURE — 3074F SYST BP LT 130 MM HG: CPT | Performed by: FAMILY MEDICINE

## 2021-03-18 PROCEDURE — 36415 COLL VENOUS BLD VENIPUNCTURE: CPT

## 2021-03-18 PROCEDURE — 84439 ASSAY OF FREE THYROXINE: CPT

## 2021-03-18 PROCEDURE — 3008F BODY MASS INDEX DOCD: CPT | Performed by: FAMILY MEDICINE

## 2021-03-18 PROCEDURE — 82306 VITAMIN D 25 HYDROXY: CPT

## 2021-03-18 RX ORDER — BUPROPION HYDROCHLORIDE 150 MG/1
150 TABLET ORAL DAILY
Qty: 30 TABLET | Refills: 0 | Status: SHIPPED | OUTPATIENT
Start: 2021-03-18

## 2021-03-18 NOTE — PROGRESS NOTES
Patient presents with:  Fatigue: dizziness- neg pregnancy tests at home      HPI:    Patient has 3 weeks late for her period. She she does not have nausea or breast pain. She is not trying to get pregnant. Pregnancy tests at home are negative.      Sh Stress due to marital problems     Migraine without aura and without status migrainosus, not intractable     Pyelonephritis     Renal abscess     Hypovitaminosis D     High triglycerides     High serum low density lipoprotein (LDL) cholesterol      Past Me membranes normal.  Nose normal. Oropharynx is clear and moist.   Eyes: Conjunctivae and EOM are normal. PERRLA. No scleral icterus. Neck: Normal range of motion. Neck supple. Normal carotid pulses and no JVD present. No edema present.  No mass and no thyr the above plan. F/u 1 month     - buPROPion HCl ER, XL, 150 MG Oral Tablet 24 Hr; Take 1 tablet (150 mg total) by mouth daily. Dispense: 30 tablet;  Refill: 0    Orders Placed This Encounter      CBC W Differential W Platelet [E]      TSH and Free T4 [E]

## 2021-05-06 ENCOUNTER — OFFICE VISIT (OUTPATIENT)
Dept: FAMILY MEDICINE CLINIC | Facility: CLINIC | Age: 26
End: 2021-05-06
Payer: COMMERCIAL

## 2021-05-06 VITALS — BODY MASS INDEX: 26.31 KG/M2 | RESPIRATION RATE: 16 BRPM | WEIGHT: 143 LBS | HEIGHT: 62 IN

## 2021-05-06 DIAGNOSIS — H04.123 DRY EYES: ICD-10-CM

## 2021-05-06 DIAGNOSIS — N92.6 MISSED MENSES: Primary | ICD-10-CM

## 2021-05-06 DIAGNOSIS — N97.0 ANOVULATION: ICD-10-CM

## 2021-05-06 DIAGNOSIS — H53.8 BLURRY VISION, BILATERAL: ICD-10-CM

## 2021-05-06 DIAGNOSIS — H57.9 EYE PRESSURE: ICD-10-CM

## 2021-05-06 PROCEDURE — 99213 OFFICE O/P EST LOW 20 MIN: CPT | Performed by: FAMILY MEDICINE

## 2021-05-06 PROCEDURE — 3008F BODY MASS INDEX DOCD: CPT | Performed by: FAMILY MEDICINE

## 2021-05-06 RX ORDER — MEDROXYPROGESTERONE ACETATE 10 MG/1
10 TABLET ORAL DAILY
Qty: 10 TABLET | Refills: 0 | Status: SHIPPED | OUTPATIENT
Start: 2021-05-06 | End: 2021-05-16

## 2021-05-06 NOTE — PROGRESS NOTES
Patient presents with: Follow - Up: Still no menses- all home pregnancy tests are negative      HPI:  Patient presents for missed menses for four months. There is a family hx of early menopause. No hx of amennorhea. pregnancy test is negative.  Denies pe Dispense Refill   • medroxyPROGESTERone Acetate 10 MG Oral Tab Take 1 tablet (10 mg total) by mouth daily for 10 days. For 10 days 10 tablet 0   • buPROPion HCl ER, XL, 150 MG Oral Tablet 24 Hr Take 1 tablet (150 mg total) by mouth daily.  30 tablet 0 vision, bilateral  Plan: OPHTHALMOLOGY - INTERNAL              Orders Placed This Encounter      POCT Pregnancy, Urine      Meds & Refills for this Visit:  Requested Prescriptions     Signed Prescriptions Disp Refills   • medroxyPROGESTERone Acetate 10 MG

## 2021-11-29 ENCOUNTER — OFFICE VISIT (OUTPATIENT)
Dept: FAMILY MEDICINE CLINIC | Facility: CLINIC | Age: 26
End: 2021-11-29
Payer: COMMERCIAL

## 2021-11-29 VITALS
OXYGEN SATURATION: 98 % | WEIGHT: 147 LBS | DIASTOLIC BLOOD PRESSURE: 70 MMHG | RESPIRATION RATE: 16 BRPM | TEMPERATURE: 98 F | SYSTOLIC BLOOD PRESSURE: 116 MMHG | HEART RATE: 74 BPM | BODY MASS INDEX: 27.05 KG/M2 | HEIGHT: 62 IN

## 2021-11-29 DIAGNOSIS — Z00.00 LABORATORY TESTS ORDERED AS PART OF A COMPLETE PHYSICAL EXAM (CPE): ICD-10-CM

## 2021-11-29 DIAGNOSIS — Z63.0 STRESS DUE TO MARITAL PROBLEMS: ICD-10-CM

## 2021-11-29 DIAGNOSIS — E01.0 THYROMEGALY: Primary | ICD-10-CM

## 2021-11-29 DIAGNOSIS — F41.1 GAD (GENERALIZED ANXIETY DISORDER): ICD-10-CM

## 2021-11-29 PROCEDURE — 3008F BODY MASS INDEX DOCD: CPT | Performed by: FAMILY MEDICINE

## 2021-11-29 PROCEDURE — 3074F SYST BP LT 130 MM HG: CPT | Performed by: FAMILY MEDICINE

## 2021-11-29 PROCEDURE — 3078F DIAST BP <80 MM HG: CPT | Performed by: FAMILY MEDICINE

## 2021-11-29 PROCEDURE — 99213 OFFICE O/P EST LOW 20 MIN: CPT | Performed by: FAMILY MEDICINE

## 2021-11-29 RX ORDER — TIMOLOL MALEATE 5 MG/ML
1 SOLUTION/ DROPS OPHTHALMIC 2 TIMES DAILY
COMMUNITY
Start: 2021-11-01

## 2021-11-29 SDOH — SOCIAL STABILITY - SOCIAL INSECURITY: PROBLEMS IN RELATIONSHIP WITH SPOUSE OR PARTNER: Z63.0

## 2021-11-29 NOTE — PROGRESS NOTES
Patient presents with:  Thyroid Problem      HPI:     Patient with marital problems. She has been under stress over it. She has noted some thyromegaly. Menses are regular. Does have dry skin weight gain fatigue.    Taking homeopathic medicine for an daily. 30 tablet 0   • timolol 0.5 % Ophthalmic Solution Place 1 drop into both eyes 2 (two) times daily.  (Patient not taking: Reported on 11/29/2021)         Allergies  No Known Allergies    Health Maintenance  Immunizations:    Immunization History  Admi Thyromegaly  - US THYROID (FFJ=[id number]); Future    4.  SELINA (generalized anxiety disorder)  - OP REFERRAL TO [facility]    Orders Placed This Encounter      CBC With Differential With Platelet      Comp Metabolic Panel (14)      Lipid Panel      TSH and Free T4

## 2021-11-30 ENCOUNTER — TELEPHONE (OUTPATIENT)
Dept: FAMILY MEDICINE CLINIC | Facility: CLINIC | Age: 26
End: 2021-11-30

## 2021-11-30 ENCOUNTER — LABORATORY ENCOUNTER (OUTPATIENT)
Dept: LAB | Age: 26
End: 2021-11-30
Attending: FAMILY MEDICINE
Payer: COMMERCIAL

## 2021-11-30 DIAGNOSIS — Z00.00 LABORATORY TESTS ORDERED AS PART OF A COMPLETE PHYSICAL EXAM (CPE): ICD-10-CM

## 2021-11-30 PROCEDURE — 85025 COMPLETE CBC W/AUTO DIFF WBC: CPT

## 2021-11-30 PROCEDURE — 80061 LIPID PANEL: CPT

## 2021-11-30 PROCEDURE — 84439 ASSAY OF FREE THYROXINE: CPT

## 2021-11-30 PROCEDURE — 80053 COMPREHEN METABOLIC PANEL: CPT

## 2021-11-30 PROCEDURE — 36415 COLL VENOUS BLD VENIPUNCTURE: CPT

## 2021-11-30 PROCEDURE — 84443 ASSAY THYROID STIM HORMONE: CPT

## 2021-11-30 NOTE — TELEPHONE ENCOUNTER
Dr.Mohammed- Macedo is requesting lab order for Sugar testing to THE [facility] today as she had fasting labs done this morning and they would like to add the Sugar lab.     Please call with any questions Ph. [phone]

## 2021-12-02 ENCOUNTER — HOSPITAL ENCOUNTER (OUTPATIENT)
Dept: ULTRASOUND IMAGING | Age: 26
Discharge: HOME | End: 2021-12-02
Attending: FAMILY MEDICINE
Payer: COMMERCIAL

## 2021-12-02 DIAGNOSIS — E01.0 THYROMEGALY: ICD-10-CM

## 2021-12-02 PROCEDURE — 76536 US EXAM OF HEAD AND NECK: CPT | Performed by: FAMILY MEDICINE

## 2022-02-24 ENCOUNTER — HOSPITAL ENCOUNTER (OUTPATIENT)
Dept: GENERAL RADIOLOGY | Age: 27
Discharge: HOME | End: 2022-02-24
Attending: FAMILY MEDICINE
Payer: COMMERCIAL

## 2022-02-24 ENCOUNTER — TELEPHONE (OUTPATIENT)
Dept: FAMILY MEDICINE CLINIC | Facility: CLINIC | Age: 27
End: 2022-02-24

## 2022-02-24 ENCOUNTER — OFFICE VISIT (OUTPATIENT)
Dept: FAMILY MEDICINE CLINIC | Facility: CLINIC | Age: 27
End: 2022-02-24
Payer: COMMERCIAL

## 2022-02-24 VITALS
RESPIRATION RATE: 16 BRPM | WEIGHT: 144 LBS | SYSTOLIC BLOOD PRESSURE: 110 MMHG | TEMPERATURE: 98 F | HEART RATE: 86 BPM | HEIGHT: 62 IN | OXYGEN SATURATION: 98 % | BODY MASS INDEX: 26.5 KG/M2 | DIASTOLIC BLOOD PRESSURE: 70 MMHG

## 2022-02-24 DIAGNOSIS — R31.9 HEMATURIA, UNSPECIFIED TYPE: ICD-10-CM

## 2022-02-24 DIAGNOSIS — M54.50 ACUTE LOW BACK PAIN WITHOUT SCIATICA, UNSPECIFIED BACK PAIN LATERALITY: Primary | ICD-10-CM

## 2022-02-24 DIAGNOSIS — M54.50 ACUTE LOW BACK PAIN WITHOUT SCIATICA, UNSPECIFIED BACK PAIN LATERALITY: ICD-10-CM

## 2022-02-24 LAB
APPEARANCE: CLEAR
BILIRUBIN: NEGATIVE
GLUCOSE (URINE DIPSTICK): NEGATIVE MG/DL
KETONES (URINE DIPSTICK): NEGATIVE MG/DL
LEUKOCYTES: NEGATIVE
NITRITE, URINE: NEGATIVE
PH, URINE: 6 (ref 4.5–8)
PROTEIN (URINE DIPSTICK): NEGATIVE MG/DL
SPECIFIC GRAVITY: 1.01 (ref 1–1.03)
URINE-COLOR: YELLOW
UROBILINOGEN,SEMI-QN: 0.2 MG/DL (ref 0–1.9)

## 2022-02-24 PROCEDURE — 81003 URINALYSIS AUTO W/O SCOPE: CPT | Performed by: FAMILY MEDICINE

## 2022-02-24 PROCEDURE — 87086 URINE CULTURE/COLONY COUNT: CPT | Performed by: FAMILY MEDICINE

## 2022-02-24 PROCEDURE — 99213 OFFICE O/P EST LOW 20 MIN: CPT | Performed by: FAMILY MEDICINE

## 2022-02-24 PROCEDURE — 3008F BODY MASS INDEX DOCD: CPT | Performed by: FAMILY MEDICINE

## 2022-02-24 PROCEDURE — 3074F SYST BP LT 130 MM HG: CPT | Performed by: FAMILY MEDICINE

## 2022-02-24 PROCEDURE — 74018 RADEX ABDOMEN 1 VIEW: CPT | Performed by: FAMILY MEDICINE

## 2022-02-24 PROCEDURE — 3078F DIAST BP <80 MM HG: CPT | Performed by: FAMILY MEDICINE

## 2022-02-24 RX ORDER — SULFAMETHOXAZOLE AND TRIMETHOPRIM 800; 160 MG/1; MG/1
1 TABLET ORAL 2 TIMES DAILY
Qty: 14 TABLET | Refills: 0 | Status: SHIPPED | OUTPATIENT
Start: 2022-02-24 | End: 2022-03-03

## 2022-02-24 NOTE — TELEPHONE ENCOUNTER
Pt states she is having back pain that has been going on for a week, pt states this pain is \"like before when I was in the hospital for a kidney infection. \" Pt states she was told she is to get an appointment right away with any of these symptoms, asked pt why she waited 1 week to attempt to get an appointment, pt did not have an answer. This writer saw Dr. Evi Durant had an opening in 7 minutes and put pt on hold to speak to Dr. Evi Durant. Discussed situation with Dr. Evi Durant and he agreed to see pt. Advised pt that Dr. Donny Cody partner agreed to squeeze pt in so urine can be tested, pt verbalized understanding and will stay in waiting room until called.    Future Appointments   Date Time Provider Tania Tan   2/24/2022  3:30 PM Topher Foster MD [facility] [address] [facility] [address]   2/25/2022  7:45 AM Heike Vang DO [facility] [address] [facility] [address]

## 2022-02-24 NOTE — TELEPHONE ENCOUNTER
- Pt stopped in the office stating she is having kidney pain. Offered patient a video visit. Pt would prefer Virtual call.     Future Appointments   Date Time Provider Tania Tan   2/25/2022  7:45 AM Sundar Hogan DO [facility] [address] [facility] [address]     Pt would also like to speak with a nurse Please call [address] [facility]. [phone]

## 2023-04-26 ENCOUNTER — OFFICE VISIT (OUTPATIENT)
Dept: FAMILY MEDICINE CLINIC | Facility: CLINIC | Age: 28
End: 2023-04-26
Payer: COMMERCIAL

## 2023-04-26 VITALS
DIASTOLIC BLOOD PRESSURE: 70 MMHG | RESPIRATION RATE: 16 BRPM | HEIGHT: 62 IN | WEIGHT: 143 LBS | SYSTOLIC BLOOD PRESSURE: 108 MMHG | TEMPERATURE: 98 F | HEART RATE: 88 BPM | BODY MASS INDEX: 26.31 KG/M2 | OXYGEN SATURATION: 99 %

## 2023-04-26 DIAGNOSIS — N30.01 ACUTE CYSTITIS WITH HEMATURIA: Primary | ICD-10-CM

## 2023-04-26 DIAGNOSIS — R30.0 DYSURIA: ICD-10-CM

## 2023-04-26 LAB
APPEARANCE: CLEAR
BILIRUBIN: NEGATIVE
GLUCOSE (URINE DIPSTICK): NEGATIVE MG/DL
NITRITE, URINE: NEGATIVE
PH, URINE: 5.5 (ref 4.5–8)
PROTEIN (URINE DIPSTICK): NEGATIVE MG/DL
SPECIFIC GRAVITY: 1.02 (ref 1–1.03)
UROBILINOGEN,SEMI-QN: 0.2 MG/DL (ref 0–1.9)

## 2023-04-26 PROCEDURE — 81003 URINALYSIS AUTO W/O SCOPE: CPT | Performed by: FAMILY MEDICINE

## 2023-04-26 PROCEDURE — 3078F DIAST BP <80 MM HG: CPT | Performed by: FAMILY MEDICINE

## 2023-04-26 PROCEDURE — 3008F BODY MASS INDEX DOCD: CPT | Performed by: FAMILY MEDICINE

## 2023-04-26 PROCEDURE — 87086 URINE CULTURE/COLONY COUNT: CPT | Performed by: FAMILY MEDICINE

## 2023-04-26 PROCEDURE — 3074F SYST BP LT 130 MM HG: CPT | Performed by: FAMILY MEDICINE

## 2023-04-26 PROCEDURE — 99213 OFFICE O/P EST LOW 20 MIN: CPT | Performed by: FAMILY MEDICINE

## 2023-04-26 RX ORDER — SULFAMETHOXAZOLE AND TRIMETHOPRIM 800; 160 MG/1; MG/1
1 TABLET ORAL 2 TIMES DAILY
Qty: 14 TABLET | Refills: 0 | Status: SHIPPED | OUTPATIENT
Start: 2023-04-26 | End: 2023-05-03

## 2023-04-26 NOTE — PATIENT INSTRUCTIONS
Thank you for choosing Kelechi Chan MD at [place] [address]  To Do: Abisai Leggett  1. Please take meds as directed. [facility] is located in [address]. Monday, Tuesday & Friday - 8 a.m. to 4 p.m. Wednesday, Thursday - 7 a.m. to 3 p.m. The lab is closed daily from 12 p.m.-12:30 p.m. Saturday lab hours by appointment. Call [phone] to schedule the appointment. Please signup for [employer], which is electronic access to your record if you have not done so. All your results will post on there. [email or web address]   You can NOW use [employer] to book your appointments with us, or consider using open access scheduling which is through the [employer] website [email or web address] [employer] and type in Kelechi Chan MD and follow the links for \"Schedule Online Now\"    To schedule Imaging or tests at [facility] Scheduling [phone], Go to [facility] A ER Building (For example: CT scans, X rays, Ultrasound, MRI)  Cardiac Testing in ER building Building A second floor Cardiac Testing [phone] (For example: Holter Monitor, Cardiac Stress tests,Event Monitor, or 2D Echocardiograms)  [facility] call [phone] usually in dg A  Walk in Clinic in [place] at [facility]. [address] Mon-Fri at 8am-7:30 p.m., and Sat/Sun 9:00a. m.-4:30 p.m. Also at [address] [facility]  Call [phone] for info     Please call our office about any questions regarding your treatment/medicines/tests as a result of today's visit. For your safety, read the entire package insert of all medicines prescribed to you and be aware of all of the risks of treatment even beyond those discussed today. All therapies have potential risk of harm or side effects or medication interactions.   It is your duty and for your safety to discuss with the pharmacist and our office with questions, and to notify us and stop treatment if problems arise, but know that our intention is that the benefits outweigh those potential risks and we strive to make you healthier and to improve your quality of life. Referrals, and Radiology Information:    If your insurance requires a referral to a specialist, please allow 5 business days to process your referral request.    If Leno Valentin MD orders a CT or MRI, it may take up to 10 business days to receive approval from your insurance company. Once our office has called informing you that the insurance company approved your testing, please call Central Scheduling at [phone]  Please allow our office 5 business days to contact you regarding any testing results. Refill policies:   Allow 3 business days for refills; controlled substances may take longer and must be picked up from the office in person. Narcotic medications can only be filled in 30 day increments and must be refilled at an office visit only. If your prescription is due for a refill, you may be due for a follow-up appointment. We cannot refill your maintenance medications at a preventative wellness visit. To best provide you care, patients receiving maintenance medications need to be seen at least twice a year.

## 2023-05-01 ENCOUNTER — PATIENT MESSAGE (OUTPATIENT)
Dept: FAMILY MEDICINE CLINIC | Facility: CLINIC | Age: 28
End: 2023-05-01

## 2023-08-04 ENCOUNTER — OFFICE VISIT (OUTPATIENT)
Dept: FAMILY MEDICINE CLINIC | Facility: CLINIC | Age: 28
End: 2023-08-04
Payer: COMMERCIAL

## 2023-08-04 VITALS
OXYGEN SATURATION: 99 % | HEART RATE: 89 BPM | RESPIRATION RATE: 16 BRPM | BODY MASS INDEX: 27.6 KG/M2 | DIASTOLIC BLOOD PRESSURE: 76 MMHG | TEMPERATURE: 97 F | SYSTOLIC BLOOD PRESSURE: 118 MMHG | WEIGHT: 150 LBS | HEIGHT: 62 IN

## 2023-08-04 DIAGNOSIS — N30.01 ACUTE CYSTITIS WITH HEMATURIA: Primary | ICD-10-CM

## 2023-08-04 DIAGNOSIS — N39.0 CHRONIC UTI: ICD-10-CM

## 2023-08-04 DIAGNOSIS — N92.6 MENSTRUAL ABNORMALITY: ICD-10-CM

## 2023-08-04 LAB
BILIRUBIN: NEGATIVE
GLUCOSE (URINE DIPSTICK): NEGATIVE MG/DL
KETONES (URINE DIPSTICK): NEGATIVE MG/DL
NITRITE, URINE: NEGATIVE
PH, URINE: 7 (ref 4.5–8)
PROTEIN (URINE DIPSTICK): NEGATIVE MG/DL
SPECIFIC GRAVITY: 1.01 (ref 1–1.03)
URINE-COLOR: YELLOW
UROBILINOGEN,SEMI-QN: 0.2 MG/DL (ref 0–1.9)

## 2023-08-04 PROCEDURE — 87088 URINE BACTERIA CULTURE: CPT | Performed by: FAMILY MEDICINE

## 2023-08-04 PROCEDURE — 87086 URINE CULTURE/COLONY COUNT: CPT | Performed by: FAMILY MEDICINE

## 2023-08-04 PROCEDURE — 3074F SYST BP LT 130 MM HG: CPT | Performed by: FAMILY MEDICINE

## 2023-08-04 PROCEDURE — 3078F DIAST BP <80 MM HG: CPT | Performed by: FAMILY MEDICINE

## 2023-08-04 PROCEDURE — 99214 OFFICE O/P EST MOD 30 MIN: CPT | Performed by: FAMILY MEDICINE

## 2023-08-04 PROCEDURE — 3008F BODY MASS INDEX DOCD: CPT | Performed by: FAMILY MEDICINE

## 2023-08-04 PROCEDURE — 81003 URINALYSIS AUTO W/O SCOPE: CPT | Performed by: FAMILY MEDICINE

## 2023-08-04 PROCEDURE — 87186 SC STD MICRODIL/AGAR DIL: CPT | Performed by: FAMILY MEDICINE

## 2023-08-04 RX ORDER — NITROFURANTOIN 25; 75 MG/1; MG/1
100 CAPSULE ORAL 2 TIMES DAILY
Qty: 14 CAPSULE | Refills: 0 | Status: SHIPPED | OUTPATIENT
Start: 2023-08-04 | End: 2023-08-11

## 2023-09-14 ENCOUNTER — LAB ENCOUNTER (OUTPATIENT)
Dept: LAB | Age: 28
End: 2023-09-14
Attending: FAMILY MEDICINE
Payer: COMMERCIAL

## 2023-09-14 ENCOUNTER — OFFICE VISIT (OUTPATIENT)
Dept: FAMILY MEDICINE CLINIC | Facility: CLINIC | Age: 28
End: 2023-09-14
Payer: COMMERCIAL

## 2023-09-14 VITALS
WEIGHT: 154 LBS | DIASTOLIC BLOOD PRESSURE: 68 MMHG | SYSTOLIC BLOOD PRESSURE: 104 MMHG | OXYGEN SATURATION: 98 % | HEART RATE: 86 BPM | RESPIRATION RATE: 18 BRPM | HEIGHT: 62 IN | TEMPERATURE: 98 F | BODY MASS INDEX: 28.34 KG/M2

## 2023-09-14 DIAGNOSIS — G43.009 MIGRAINE WITHOUT AURA AND WITHOUT STATUS MIGRAINOSUS, NOT INTRACTABLE: ICD-10-CM

## 2023-09-14 DIAGNOSIS — Z00.00 WELL ADULT EXAM: Primary | ICD-10-CM

## 2023-09-14 DIAGNOSIS — E78.1 HIGH TRIGLYCERIDES: ICD-10-CM

## 2023-09-14 DIAGNOSIS — N92.6 LATE MENSES: ICD-10-CM

## 2023-09-14 DIAGNOSIS — L67.8 ABNORMAL FACIAL HAIR: ICD-10-CM

## 2023-09-14 DIAGNOSIS — L70.0 ACNE VULGARIS: ICD-10-CM

## 2023-09-14 DIAGNOSIS — N92.6 MENSTRUAL ABNORMALITY: ICD-10-CM

## 2023-09-14 DIAGNOSIS — E55.9 HYPOVITAMINOSIS D: ICD-10-CM

## 2023-09-14 DIAGNOSIS — E01.0 THYROMEGALY: ICD-10-CM

## 2023-09-14 DIAGNOSIS — R63.5 WEIGHT GAIN: ICD-10-CM

## 2023-09-14 DIAGNOSIS — Z00.00 WELL ADULT EXAM: ICD-10-CM

## 2023-09-14 LAB
ALBUMIN SERPL-MCNC: 4.3 G/DL (ref 3.4–5)
ALBUMIN/GLOB SERPL: 1.1 {RATIO} (ref 1–2)
ALP LIVER SERPL-CCNC: 89 U/L
ALT SERPL-CCNC: 46 U/L
ANION GAP SERPL CALC-SCNC: 5 MMOL/L (ref 0–18)
AST SERPL-CCNC: 24 U/L (ref 15–37)
BASOPHILS # BLD AUTO: 0.02 X10(3) UL (ref 0–0.2)
BASOPHILS NFR BLD AUTO: 0.3 %
BILIRUB SERPL-MCNC: 0.2 MG/DL (ref 0.1–2)
BUN BLD-MCNC: 8 MG/DL (ref 7–18)
CALCIUM BLD-MCNC: 9.6 MG/DL (ref 8.5–10.1)
CHLORIDE SERPL-SCNC: 107 MMOL/L (ref 98–112)
CHOLEST SERPL-MCNC: 193 MG/DL (ref ?–200)
CO2 SERPL-SCNC: 26 MMOL/L (ref 21–32)
CONTROL LINE PRESENT WITH A CLEAR BACKGROUND (YES/NO): YES YES/NO
CREAT BLD-MCNC: 0.71 MG/DL
EGFRCR SERPLBLD CKD-EPI 2021: 119 ML/MIN/1.73M2 (ref 60–?)
EOSINOPHIL # BLD AUTO: 0.2 X10(3) UL (ref 0–0.7)
EOSINOPHIL NFR BLD AUTO: 2.7 %
ERYTHROCYTE [DISTWIDTH] IN BLOOD BY AUTOMATED COUNT: 12.1 %
EST. AVERAGE GLUCOSE BLD GHB EST-MCNC: 114 MG/DL (ref 68–126)
FASTING PATIENT LIPID ANSWER: NO
FASTING STATUS PATIENT QL REPORTED: NO
FSH SERPL-ACNC: 7.5 MIU/ML
GLOBULIN PLAS-MCNC: 3.8 G/DL (ref 2.8–4.4)
GLUCOSE BLD-MCNC: 100 MG/DL (ref 70–99)
HBA1C MFR BLD: 5.6 % (ref ?–5.7)
HCT VFR BLD AUTO: 43.6 %
HDLC SERPL-MCNC: 42 MG/DL (ref 40–59)
HGB BLD-MCNC: 14.6 G/DL
IMM GRANULOCYTES # BLD AUTO: 0.02 X10(3) UL (ref 0–1)
IMM GRANULOCYTES NFR BLD: 0.3 %
KIT EXPIRATION DATE: NORMAL DATE
KIT LOT #: NORMAL NUMERIC
LDLC SERPL CALC-MCNC: 117 MG/DL (ref ?–100)
LH SERPL-ACNC: 10.3 MIU/ML
LYMPHOCYTES # BLD AUTO: 3.6 X10(3) UL (ref 1–4)
LYMPHOCYTES NFR BLD AUTO: 47.9 %
MCH RBC QN AUTO: 29.2 PG (ref 26–34)
MCHC RBC AUTO-ENTMCNC: 33.5 G/DL (ref 31–37)
MCV RBC AUTO: 87.2 FL
MONOCYTES # BLD AUTO: 0.45 X10(3) UL (ref 0.1–1)
MONOCYTES NFR BLD AUTO: 6 %
NEUTROPHILS # BLD AUTO: 3.22 X10 (3) UL (ref 1.5–7.7)
NEUTROPHILS # BLD AUTO: 3.22 X10(3) UL (ref 1.5–7.7)
NEUTROPHILS NFR BLD AUTO: 42.8 %
NONHDLC SERPL-MCNC: 151 MG/DL (ref ?–130)
OSMOLALITY SERPL CALC.SUM OF ELEC: 284 MOSM/KG (ref 275–295)
PLATELET # BLD AUTO: 286 10(3)UL (ref 150–450)
POTASSIUM SERPL-SCNC: 3.8 MMOL/L (ref 3.5–5.1)
PROLACTIN SERPL-MCNC: 3.9 NG/ML
PROT SERPL-MCNC: 8.1 G/DL (ref 6.4–8.2)
RBC # BLD AUTO: 5 X10(6)UL
SODIUM SERPL-SCNC: 138 MMOL/L (ref 136–145)
THYROGLOB SERPL-MCNC: <15 U/ML (ref ?–60)
THYROPEROXIDASE AB SERPL-ACNC: 38 U/ML (ref ?–60)
TRIGL SERPL-MCNC: 195 MG/DL (ref 30–149)
TSI SER-ACNC: 1.07 MIU/ML (ref 0.36–3.74)
VIT D+METAB SERPL-MCNC: 19.1 NG/ML (ref 30–100)
VLDLC SERPL CALC-MCNC: 34 MG/DL (ref 0–30)
WBC # BLD AUTO: 7.5 X10(3) UL (ref 4–11)

## 2023-09-14 PROCEDURE — 99213 OFFICE O/P EST LOW 20 MIN: CPT | Performed by: FAMILY MEDICINE

## 2023-09-14 PROCEDURE — 84443 ASSAY THYROID STIM HORMONE: CPT

## 2023-09-14 PROCEDURE — 36415 COLL VENOUS BLD VENIPUNCTURE: CPT

## 2023-09-14 PROCEDURE — 82306 VITAMIN D 25 HYDROXY: CPT

## 2023-09-14 PROCEDURE — 85025 COMPLETE CBC W/AUTO DIFF WBC: CPT

## 2023-09-14 PROCEDURE — 3008F BODY MASS INDEX DOCD: CPT | Performed by: FAMILY MEDICINE

## 2023-09-14 PROCEDURE — 83036 HEMOGLOBIN GLYCOSYLATED A1C: CPT

## 2023-09-14 PROCEDURE — 84410 TESTOSTERONE BIOAVAILABLE: CPT

## 2023-09-14 PROCEDURE — 3074F SYST BP LT 130 MM HG: CPT | Performed by: FAMILY MEDICINE

## 2023-09-14 PROCEDURE — 80061 LIPID PANEL: CPT

## 2023-09-14 PROCEDURE — 83001 ASSAY OF GONADOTROPIN (FSH): CPT

## 2023-09-14 PROCEDURE — 99395 PREV VISIT EST AGE 18-39: CPT | Performed by: FAMILY MEDICINE

## 2023-09-14 PROCEDURE — 86376 MICROSOMAL ANTIBODY EACH: CPT

## 2023-09-14 PROCEDURE — 83002 ASSAY OF GONADOTROPIN (LH): CPT

## 2023-09-14 PROCEDURE — 84143 ASSAY OF 17-HYDROXYPREGNENO: CPT

## 2023-09-14 PROCEDURE — 84146 ASSAY OF PROLACTIN: CPT

## 2023-09-14 PROCEDURE — 3078F DIAST BP <80 MM HG: CPT | Performed by: FAMILY MEDICINE

## 2023-09-14 PROCEDURE — 86800 THYROGLOBULIN ANTIBODY: CPT

## 2023-09-14 PROCEDURE — 80053 COMPREHEN METABOLIC PANEL: CPT

## 2023-09-14 PROCEDURE — 81025 URINE PREGNANCY TEST: CPT | Performed by: FAMILY MEDICINE

## 2023-09-18 LAB — 17-OH PROGESTERONE: 45 NG/DL

## 2023-09-20 LAB
SEX HORM BIND GLOB: 24.2 NMOL/L
TESTOST % FREE+WEAK BND: 35.6 %
TESTOST FREE+WEAK BND: 15.6 NG/DL
TESTOSTERONE TOT /MS: 43.8 NG/DL

## 2023-09-22 ENCOUNTER — OFFICE VISIT (OUTPATIENT)
Dept: FAMILY MEDICINE CLINIC | Facility: CLINIC | Age: 28
End: 2023-09-22
Payer: COMMERCIAL

## 2023-09-22 VITALS
DIASTOLIC BLOOD PRESSURE: 64 MMHG | OXYGEN SATURATION: 99 % | RESPIRATION RATE: 16 BRPM | WEIGHT: 153 LBS | HEIGHT: 62 IN | HEART RATE: 84 BPM | SYSTOLIC BLOOD PRESSURE: 92 MMHG | BODY MASS INDEX: 28.16 KG/M2 | TEMPERATURE: 98 F

## 2023-09-22 DIAGNOSIS — E28.8 HYPERANDROGENISM: Primary | ICD-10-CM

## 2023-09-22 DIAGNOSIS — Z23 FLU VACCINE NEED: ICD-10-CM

## 2023-09-28 ENCOUNTER — HOSPITAL ENCOUNTER (OUTPATIENT)
Dept: ULTRASOUND IMAGING | Age: 28
Discharge: HOME | End: 2023-09-28
Attending: FAMILY MEDICINE
Payer: COMMERCIAL

## 2023-09-28 DIAGNOSIS — N92.6 MENSTRUAL ABNORMALITY: ICD-10-CM

## 2023-09-28 DIAGNOSIS — N92.6 LATE MENSES: ICD-10-CM

## 2023-09-28 PROCEDURE — 76856 US EXAM PELVIC COMPLETE: CPT | Performed by: FAMILY MEDICINE

## 2024-04-17 NOTE — PROGRESS NOTES
Health Maintenance       Pneumococcal Vaccine 0-64 (1 of 2 - PCV)  Never done    COVID-19 Vaccine (4 - 2023-24 season)  Overdue since 9/1/2023    Hepatitis B Vaccine (1 of 3 - 19+ 3-dose series)  Postponed until 8/1/2024           Following review of the above:  Patient is not proceeding with: COVID-19 and Pneumococcal    Note: Refer to final orders and clinician documentation.       Pt here for level 2 ultrasound  States +FM  No complaints

## 2024-05-04 ENCOUNTER — OFFICE VISIT (OUTPATIENT)
Dept: FAMILY MEDICINE CLINIC | Facility: CLINIC | Age: 29
End: 2024-05-04
Payer: COMMERCIAL

## 2024-05-04 VITALS
TEMPERATURE: 98 F | HEART RATE: 88 BPM | BODY MASS INDEX: 27.97 KG/M2 | SYSTOLIC BLOOD PRESSURE: 119 MMHG | DIASTOLIC BLOOD PRESSURE: 82 MMHG | RESPIRATION RATE: 18 BRPM | HEIGHT: 62 IN | OXYGEN SATURATION: 99 % | WEIGHT: 152 LBS

## 2024-05-04 DIAGNOSIS — R39.9 UTI SYMPTOMS: Primary | ICD-10-CM

## 2024-05-04 DIAGNOSIS — N89.8 VAGINAL ITCHING: ICD-10-CM

## 2024-05-04 LAB
BILIRUBIN: NEGATIVE
GLUCOSE (URINE DIPSTICK): NEGATIVE MG/DL
KETONES (URINE DIPSTICK): NEGATIVE MG/DL
MULTISTIX LOT#: ABNORMAL NUMERIC
NITRITE, URINE: NEGATIVE
PH, URINE: 7 (ref 4.5–8)
PROTEIN (URINE DIPSTICK): 30 MG/DL
SPECIFIC GRAVITY: 1.02 (ref 1–1.03)
URINE-COLOR: YELLOW
UROBILINOGEN,SEMI-QN: 0.2 MG/DL (ref 0–1.9)

## 2024-05-04 PROCEDURE — 87086 URINE CULTURE/COLONY COUNT: CPT | Performed by: NURSE PRACTITIONER

## 2024-05-04 PROCEDURE — 87186 SC STD MICRODIL/AGAR DIL: CPT | Performed by: NURSE PRACTITIONER

## 2024-05-04 PROCEDURE — 87088 URINE BACTERIA CULTURE: CPT | Performed by: NURSE PRACTITIONER

## 2024-05-04 PROCEDURE — 81003 URINALYSIS AUTO W/O SCOPE: CPT | Performed by: NURSE PRACTITIONER

## 2024-05-04 PROCEDURE — 99213 OFFICE O/P EST LOW 20 MIN: CPT | Performed by: NURSE PRACTITIONER

## 2024-05-04 RX ORDER — FLUCONAZOLE 150 MG/1
150 TABLET ORAL ONCE
Qty: 1 TABLET | Refills: 0 | Status: SHIPPED | OUTPATIENT
Start: 2024-05-04 | End: 2024-05-04

## 2024-05-04 RX ORDER — NITROFURANTOIN 25; 75 MG/1; MG/1
100 CAPSULE ORAL 2 TIMES DAILY
Qty: 14 CAPSULE | Refills: 0 | Status: SHIPPED | OUTPATIENT
Start: 2024-05-04 | End: 2024-05-11

## 2024-05-04 NOTE — PROGRESS NOTES
Abisai Leggett is a 28 year old female.  HPI:   Patient presents with urinary symptoms. Complaining of urinary frequency, urgency, dysuria, low suprapubic pain. Denies flank pain, fever, hematuria.  Duration: 3-4 days  Sexual activity: with partner, both very concerned for reason for symptoms today  Vaginal discharge: scant white, mild itch, recent antibiotic use in the past two weeks  Treatments tried: nothing. Patient is fearful as she had multiple issues with urinary/vaginal complaints with hospitalization in 2020 for pyelonephritis.      No current outpatient medications on file.      Past Medical History:    High serum low density lipoprotein (LDL) cholesterol    High triglycerides    Hypovitaminosis D    Migraine without aura and without status migrainosus, not intractable      Social History:  Social History     Socioeconomic History    Marital status:    Tobacco Use    Smoking status: Never    Smokeless tobacco: Never   Vaping Use    Vaping status: Never Used   Substance and Sexual Activity    Alcohol use: No     Alcohol/week: 0.0 standard drinks of alcohol    Drug use: No    Sexual activity: Yes     Partners: Male         REVIEW OF SYSTEMS:   GENERAL HEALTH: feels well otherwise  SKIN: denies any unusual skin lesions or rashes  RESPIRATORY: denies shortness of breath with exertion  CARDIOVASCULAR: denies chest pain on exertion  GI: denies nausea or vomiting  : denies hematuria, as above  NEURO: denies headaches  MUSCULOSKELETAL:  denies CVA tenderness, noted one twinge of left sided low back pain this morning before urinating.    EXAM:   /82   Pulse 88   Temp 98.1 °F (36.7 °C) (Oral)   Resp 18   Ht 5' 2\" (1.575 m)   Wt 152 lb (68.9 kg)   LMP 04/20/2024 (Exact Date)   SpO2 99%   BMI 27.80 kg/m²   GENERAL: well developed, well nourished,in no apparent distress  CARDIOVASCULAR:  Normal rate, normal S1, S2  SKIN: no rashes,no suspicious lesions  MUSCULOSKELETAL:  No CVA tenderness  GI/:   abd soft, bladder soft and nondistended. no suprapubic tenderness  PSYCHIATRIC:  Alert and oriented x 3.  Results for orders placed or performed in visit on 05/04/24   Urine Dip, auto without Micro    Collection Time: 05/04/24 11:56 AM   Result Value Ref Range    Glucose Urine Negative Negative mg/dL    Bilirubin Urine Negative Negative    Ketones, UA Negative Negative - Trace mg/dL    Spec Gravity 1.020 1.005 - 1.030    Blood Urine Moderate (A) Negative    PH Urine 7.0 5.0 - 8.0    Protein Urine 30 (A) Negative - Trace mg/dL    Urobilinogen Urine 0.2 0.2 - 1.0 mg/dL    Nitrite Urine Negative Negative    Leukocyte Esterase Urine Moderate (A) Negative    APPEARANCE Cloudy Clear    Color Urine yellow Yellow    Multistix Lot# [id number] Numeric    Multistix Expiration Date 12/31/2024 Date         ASSESSMENT AND PLAN:   Abisai was seen today for uti.    Diagnoses and all orders for this visit:    UTI symptoms  -     Urine Dip, auto without Micro  -     Urine Culture, Routine [E]  -     nitrofurantoin monohydrate macro 100 MG Oral Cap; Take 1 capsule (100 mg total) by mouth 2 (two) times daily for 7 days.    Vaginal itching  -     fluconazole 150 MG Oral Tab; Take 1 tablet (150 mg total) by mouth once for 1 dose.      Meds as above. Multiple questions answered for partner/patient concerning possibility of reoccurrence of symptoms similar to 2020, medication use and risk/benefit, prevention methods, supplements to use for care. Reassurance given. Instructions printed for patient/partner.  Patient will follow up with primary care if not improving and seek immediate care for worsening symptoms.  Patient verbalized understanding and agrees to plan.   Patient Instructions   Please start the antibiotic as discussed. We will send a urine culture and advise you if a change is needed in your antibiotic.  You may take the Diflucan today with the first dose of antibiotic to help remove vaginal itching.   You may take Ibuprofen 600 mg  every 8 hours for pain. A warm pack to lower abdomen may be comforting. Avoid soaps, lotions or creams to the vaginal area.  Wipe from front to back after using the bathroom every time. Avoid intercourse for the next three days.  Change your pad frequently during your menses.  when sexually active urinate before and after sexual intercourse and wipe front to back.  Stay well hydrated, wear cotton underwear to decrease the spread of E. Coli to the urethra.  Cranberry extract may help make bacteria less likely to live in the bladder. Consider supplements during illness or regularly if frequent infections occur.  Please follow up with your primary care provider if not improved despite treatment. They can discuss methods for prevention if symptoms frequently occur. Go to the emergency room for worsening symptoms such as increased abdominal or back pain, fever, vomiting, or any other concerning symptoms.

## 2024-05-04 NOTE — PATIENT INSTRUCTIONS
Please start the antibiotic as discussed. We will send a urine culture and advise you if a change is needed in your antibiotic.  You may take the Diflucan today with the first dose of antibiotic to help remove vaginal itching.   You may take Ibuprofen 600 mg every 8 hours for pain. A warm pack to lower abdomen may be comforting. Avoid soaps, lotions or creams to the vaginal area.  Wipe from front to back after using the bathroom every time. Avoid intercourse for the next three days.  Change your pad frequently during your menses.  when sexually active urinate before and after sexual intercourse and wipe front to back.  Stay well hydrated, wear cotton underwear to decrease the spread of E. Coli to the urethra.  Cranberry extract may help make bacteria less likely to live in the bladder. Consider supplements during illness or regularly if frequent infections occur.  Please follow up with your primary care provider if not improved despite treatment. They can discuss methods for prevention if symptoms frequently occur. Go to the emergency room for worsening symptoms such as increased abdominal or back pain, fever, vomiting, or any other concerning symptoms.

## 2024-05-23 ENCOUNTER — OFFICE VISIT (OUTPATIENT)
Dept: FAMILY MEDICINE CLINIC | Facility: CLINIC | Age: 29
End: 2024-05-23

## 2024-05-23 ENCOUNTER — TELEPHONE (OUTPATIENT)
Dept: FAMILY MEDICINE CLINIC | Facility: CLINIC | Age: 29
End: 2024-05-23

## 2024-05-23 VITALS
OXYGEN SATURATION: 99 % | WEIGHT: 154 LBS | RESPIRATION RATE: 16 BRPM | DIASTOLIC BLOOD PRESSURE: 60 MMHG | SYSTOLIC BLOOD PRESSURE: 104 MMHG | BODY MASS INDEX: 28.34 KG/M2 | HEART RATE: 104 BPM | TEMPERATURE: 98 F | HEIGHT: 62 IN

## 2024-05-23 DIAGNOSIS — R30.0 DYSURIA: ICD-10-CM

## 2024-05-23 DIAGNOSIS — R10.2 VAGINAL PAIN: ICD-10-CM

## 2024-05-23 DIAGNOSIS — R39.9 URINARY SYMPTOM OR SIGN: Primary | ICD-10-CM

## 2024-05-23 LAB
APPEARANCE: CLEAR
BILIRUBIN: NEGATIVE
CONTROL LINE PRESENT WITH A CLEAR BACKGROUND (YES/NO): YES YES/NO
GLUCOSE (URINE DIPSTICK): NEGATIVE MG/DL
KIT LOT #: NORMAL NUMERIC
LEUKOCYTES: NEGATIVE
MULTISTIX LOT#: ABNORMAL NUMERIC
NITRITE, URINE: NEGATIVE
PH, URINE: 6 (ref 4.5–8)
PROTEIN (URINE DIPSTICK): 30 MG/DL
SPECIFIC GRAVITY: >1.03 (ref 1–1.03)
UROBILINOGEN,SEMI-QN: 0.2 MG/DL (ref 0–1.9)

## 2024-05-23 PROCEDURE — 87086 URINE CULTURE/COLONY COUNT: CPT | Performed by: NURSE PRACTITIONER

## 2024-05-23 NOTE — PROGRESS NOTES
CHIEF COMPLAINT:     Chief Complaint   Patient presents with    Urinary Symptoms     C/o vaginal pain, frequency   Sx onset 3 days   Denies burning        HPI:   Abisai Leggett is a 28 year old female who presents with symptoms of vaginal pain and dysuria x 3 days. Reports increased clear/ white vaginal discharge. Discharge has no odor, no itching. Has some increased frequency of urination. States has pain near opening of the vagina, the pain is constant, is moderate in severity.  Pt reports she is sexually active, does not use condoms, does not use birth control, does not believe she is pregnant. LMP 4/20/24. Reports hx of irregular periods. Reports she has concern for possible STD exposure, has had similar symptoms before that happened after intercourse. Reports had similar symptoms earlier this month and was diagnosed with urinary tract infection.     No current outpatient medications on file.      Past Medical History:    High serum low density lipoprotein (LDL) cholesterol    High triglycerides    Hypovitaminosis D    Migraine without aura and without status migrainosus, not intractable      Social History:  Social History     Socioeconomic History    Marital status:    Tobacco Use    Smoking status: Never    Smokeless tobacco: Never   Vaping Use    Vaping status: Never Used   Substance and Sexual Activity    Alcohol use: No     Alcohol/week: 0.0 standard drinks of alcohol    Drug use: No    Sexual activity: Yes     Partners: Male         REVIEW OF SYSTEMS:   GENERAL: Denies fever, chills, or body aches  SKIN: no rashes, no skin wounds or ulcers.  GI: See HPI. No N/V/C/D.   : See HPI.  NEURO: no headaches.    EXAM:   /60   Pulse 104   Temp 97.8 °F (36.6 °C)   Resp 16   Ht 5' 2\" (1.575 m)   Wt 154 lb (69.9 kg)   LMP 04/20/2024 (Approximate)   SpO2 99%   BMI 28.17 kg/m²   GENERAL: well developed, well nourished,in no apparent distress  CARDIO: RRR, no murmurs  LUNGS: clear to ausculation  bilaterally, no wheezing or rhonchi  GI: BS present x 4.  No tenderness to palpation on all 4 quadrants.   : no suprapubic tenderness. No bladder distention, or CVAT. Pt refused pelvic/ genital exam.     Recent Results (from the past 24 hour(s))   URINALYSIS, AUTO, W/O SCOPE    Collection Time: 05/23/24  4:59 PM   Result Value Ref Range    Glucose Urine Negative Negative mg/dL    Bilirubin Urine Negative Negative    Ketones, UA Trace Negative - Trace mg/dL    Spec Gravity >1.030 1.005 - 1.030    Blood Urine Small (A) Negative    PH Urine 6.0 5.0 - 8.0    Protein Urine 30 (A) Negative - Trace mg/dL    Urobilinogen Urine 0.2 0.2 - 1.0 mg/dL    Nitrite Urine Negative Negative    Leukocyte Esterase Urine Negative Negative    APPEARANCE clear Clear    Color Urine kun Yellow    Multistix Lot# [id number] Numeric    Multistix Expiration Date 12/31/24 Date   Urine Preg Test    Collection Time: 05/23/24  5:23 PM   Result Value Ref Range    Pregnancy Test, Urine Neg     Control Line Present with a clear background (yes/no) Yes Yes/No    Kit Lot # [id number] Numeric    Kit Expiration Date 1/11/25 Date         ASSESSMENT AND PLAN:   Abisai Leggett is a 28 year old female presents with UTI symptoms.    ASSESSMENT:  Encounter Diagnoses   Name Primary?    Urinary symptom or sign Yes    Dysuria     Vaginal pain        PLAN:   UA benign except trace ketones and small blood. Advised to push fluids. Culture ordered for reassurance. Advised to push fluids.   Due to reported vaginal pain and discharge, recommended genital exam, offered chaperone. Patient refused external genital exam and speculum exam. Discussed possible differentials including cyst, small tear, yeast, BV, possible STD's. Advised if infection is present there is a potential that the infection could worsen and cause severe/serious infection.  Patient verbalized understanding. States she has had similar symptoms before that resolved, states if pain persists, will follow up with  OB/GYN.  Advised to seek urgent follow up for new/worsening symptoms.     Meds & Refills for this Visit:  Requested Prescriptions      No prescriptions requested or ordered in this encounter         Patient Instructions   I recommend genital exam to further evaluate your vaginal pain. You can follow up with your OB/GYN or your primary care doctor if you are more comfortable with them doing this exam. Seek urgent evaluation for any new/ worsening pain such as abdominal pain or fever.       The patient indicates understanding of these issues and agrees to the plan.  The patient is asked to return in 3 days if not better. Call if fever, vomiting, worsening symptoms.

## 2024-05-23 NOTE — TELEPHONE ENCOUNTER
Patient scheduled via [employer]:  Future Appointments   Date Time Provider Department Center   5/28/2024 12:00 PM Dre See MD [facility] [address] [facility] [address]    Sent a [employer] message to patient to clarify PCP.

## 2024-05-23 NOTE — PATIENT INSTRUCTIONS
I recommend genital exam to further evaluate your vaginal pain. You can follow up with your OB/GYN or your primary care doctor if you are more comfortable with them doing this exam. Seek urgent evaluation for any new/ worsening pain such as abdominal pain or fever.

## 2024-05-23 NOTE — TELEPHONE ENCOUNTER
Patient has appointment at [facility] today at 5pm    Future Appointments   Date Time Provider Department Center   5/23/2024  5:00 PM [facility] WALK IN [facility] PF [facility] [place] 5/28/2024 12:00 PM Dre See MD [facility] [address]

## 2024-05-23 NOTE — TELEPHONE ENCOUNTER
Called patient. She is currently outside and unable to talk. Can talk around 4 pm today or will call back sooner when she is able to discuss

## 2024-10-02 ENCOUNTER — OFFICE VISIT (OUTPATIENT)
Dept: FAMILY MEDICINE CLINIC | Facility: CLINIC | Age: 29
End: 2024-10-02
Payer: COMMERCIAL

## 2024-10-02 VITALS
BODY MASS INDEX: 28 KG/M2 | RESPIRATION RATE: 16 BRPM | DIASTOLIC BLOOD PRESSURE: 88 MMHG | OXYGEN SATURATION: 99 % | WEIGHT: 153 LBS | TEMPERATURE: 98 F | SYSTOLIC BLOOD PRESSURE: 124 MMHG | HEART RATE: 90 BPM

## 2024-10-02 DIAGNOSIS — R35.0 FREQUENT URINATION: ICD-10-CM

## 2024-10-02 DIAGNOSIS — N30.01 ACUTE CYSTITIS WITH HEMATURIA: Primary | ICD-10-CM

## 2024-10-02 LAB
GLUCOSE (URINE DIPSTICK): NEGATIVE MG/DL
KETONES (URINE DIPSTICK): NEGATIVE MG/DL
MULTISTIX LOT#: ABNORMAL NUMERIC
NITRITE, URINE: NEGATIVE
PH, URINE: 6 (ref 4.5–8)
PROTEIN (URINE DIPSTICK): >=300 MG/DL
SPECIFIC GRAVITY: >=1.03 (ref 1–1.03)
URINE-COLOR: YELLOW
UROBILINOGEN,SEMI-QN: 0.2 MG/DL (ref 0–1.9)

## 2024-10-02 PROCEDURE — 87186 SC STD MICRODIL/AGAR DIL: CPT | Performed by: NURSE PRACTITIONER

## 2024-10-02 PROCEDURE — 81003 URINALYSIS AUTO W/O SCOPE: CPT | Performed by: NURSE PRACTITIONER

## 2024-10-02 PROCEDURE — 87088 URINE BACTERIA CULTURE: CPT | Performed by: NURSE PRACTITIONER

## 2024-10-02 PROCEDURE — 87086 URINE CULTURE/COLONY COUNT: CPT | Performed by: NURSE PRACTITIONER

## 2024-10-02 PROCEDURE — 99213 OFFICE O/P EST LOW 20 MIN: CPT | Performed by: NURSE PRACTITIONER

## 2024-10-02 RX ORDER — NITROFURANTOIN 25; 75 MG/1; MG/1
100 CAPSULE ORAL 2 TIMES DAILY
Qty: 14 CAPSULE | Refills: 0 | Status: SHIPPED | OUTPATIENT
Start: 2024-10-02 | End: 2024-10-09

## 2024-10-02 NOTE — PROGRESS NOTES
CHIEF COMPLAINT:     Chief Complaint   Patient presents with    Urinary Symptoms     Entered by patient  Freq/urgency s/s since AM.         HPI:   Abisai Leggett is a 28 year old female who presents with symptoms of UTI. Patient reporting symptoms of dysuria, foul smelling urine for 1 days.  Symptoms have been worsening since onset.  Treatments tried: none.  Associated symptoms: urgency.  Patient denies flank pain, fever, hematuria, nausea, or vomiting. Patient denies genital discharge or itching. Patient denies concern for STI.    Current Outpatient Medications   Medication Sig Dispense Refill    nitrofurantoin monohydrate macro 100 MG Oral Cap Take 1 capsule (100 mg total) by mouth 2 (two) times daily for 7 days. 14 capsule 0    Norethin Ace-Eth Estrad-FE 1-20 MG-MCG Oral Tab Take 1 tablet by mouth daily. 28 tablet 12    buPROPion  MG Oral Tablet 24 Hr Take 1 tablet (150 mg total) by mouth daily. 30 tablet 0      Past Medical History:    High serum low density lipoprotein (LDL) cholesterol    High triglycerides    Hypovitaminosis D    Migraine without aura and without status migrainosus, not intractable      Social History:  Social History     Socioeconomic History    Marital status:    Tobacco Use    Smoking status: Never     Passive exposure: Never    Smokeless tobacco: Never   Vaping Use    Vaping status: Never Used   Substance and Sexual Activity    Alcohol use: No     Alcohol/week: 0.0 standard drinks of alcohol    Drug use: No    Sexual activity: Yes     Partners: Male         REVIEW OF SYSTEMS:   GENERAL: See above  GI: See HPI.    : See HPI.      EXAM:   /88   Pulse 90   Temp 98.1 °F (36.7 °C) (Oral)   Resp 16   Wt 153 lb (69.4 kg)   LMP 09/07/2024 (Exact Date)   SpO2 99%   BMI 27.98 kg/m²   GENERAL: well developed, well nourished,in no apparent distress  CARDIO: RRR, no murmurs  LUNGS: clear to ausculation bilaterally, no wheezing or rhonchi  GI: BS present x 4.  No  hepatosplenomegaly.  : denies suprapubic tenderness. No bladder distention, or CVAT     Recent Results (from the past 24 hour(s))   Urine Dip, auto without Micro    Collection Time: 10/02/24  6:47 PM   Result Value Ref Range    Glucose Urine Negative Negative mg/dL    Bilirubin Urine Small (A) Negative    Ketones, UA Negative Negative - Trace mg/dL    Spec Gravity >=1.030 1.005 - 1.030    Blood Urine Moderate (A) Negative    PH Urine 6.0 5.0 - 8.0    Protein Urine >=300 (A) Negative - Trace mg/dL    Urobilinogen Urine 0.2 0.2 - 1.0 mg/dL    Nitrite Urine Negative Negative    Leukocyte Esterase Urine Small (A) Negative    APPEARANCE Cloudy Clear    Color Urine Yellow Yellow    Multistix Lot# [id number] Numeric    Multistix Expiration Date 05/31/2025 Date         ASSESSMENT AND PLAN:   Abisai Leggett is a 28 year old female presents with urinary symptoms.    ASSESSMENT:  Encounter Diagnoses   Name Primary?    Frequent urination     Acute cystitis with hematuria Yes       PLAN: Meds as listed below.  Comfort measures as described in Patient Instructions. will send urine culture.     Meds & Refills for this Visit:  Requested Prescriptions     Signed Prescriptions Disp Refills    nitrofurantoin monohydrate macro 100 MG Oral Cap 14 capsule 0     Sig: Take 1 capsule (100 mg total) by mouth 2 (two) times daily for 7 days.       Risk and benefits of medication discussed.   Stressed importance of completing full course of antibiotic unless told otherwise.     The patient indicates understanding of these issues and agrees to the plan.  The patient is asked to see PCP in 3 days if not better. Seek care immediately for new onset of fever, vomiting, worsening symptoms.    There are no Patient Instructions on file for this visit.

## 2025-03-18 ENCOUNTER — OFFICE VISIT (OUTPATIENT)
Dept: FAMILY MEDICINE CLINIC | Facility: CLINIC | Age: 30
End: 2025-03-18
Payer: COMMERCIAL

## 2025-03-18 ENCOUNTER — HOSPITAL ENCOUNTER (OUTPATIENT)
Dept: GENERAL RADIOLOGY | Age: 30
Discharge: HOME | End: 2025-03-18
Attending: NURSE PRACTITIONER
Payer: COMMERCIAL

## 2025-03-18 VITALS
RESPIRATION RATE: 16 BRPM | DIASTOLIC BLOOD PRESSURE: 80 MMHG | HEART RATE: 94 BPM | BODY MASS INDEX: 26.22 KG/M2 | TEMPERATURE: 99 F | OXYGEN SATURATION: 99 % | SYSTOLIC BLOOD PRESSURE: 108 MMHG | WEIGHT: 148 LBS | HEIGHT: 63 IN

## 2025-03-18 DIAGNOSIS — R05.9 COUGH, UNSPECIFIED TYPE: ICD-10-CM

## 2025-03-18 DIAGNOSIS — J11.1 INFLUENZA-LIKE ILLNESS: Primary | ICD-10-CM

## 2025-03-18 DIAGNOSIS — R06.02 SHORTNESS OF BREATH: ICD-10-CM

## 2025-03-18 PROCEDURE — 87637 SARSCOV2&INF A&B&RSV AMP PRB: CPT | Performed by: NURSE PRACTITIONER

## 2025-03-18 PROCEDURE — 99213 OFFICE O/P EST LOW 20 MIN: CPT | Performed by: NURSE PRACTITIONER

## 2025-03-18 PROCEDURE — 71046 X-RAY EXAM CHEST 2 VIEWS: CPT | Performed by: NURSE PRACTITIONER

## 2025-03-18 RX ORDER — OSELTAMIVIR PHOSPHATE 75 MG/1
75 CAPSULE ORAL 2 TIMES DAILY
Qty: 10 CAPSULE | Refills: 0 | Status: SHIPPED | OUTPATIENT
Start: 2025-03-18 | End: 2025-03-23

## 2025-03-18 NOTE — PROGRESS NOTES
Chief Complaint   Patient presents with    Cough     Sx onset yesterday w fatigued, chest congestion, bilat burning to eyes, ST, back pain, fever high of 102 yesterday  Negative Covid test yesterday  OTC Tylenol  Took old rx x Amox, took 1 dose yesterday     :    HPI:   Abisai Leggett is a 29 year old female who presents for upper respiratory symptoms for 1 day. Started suddenly.  Symptoms have been consistent since onset.  Feeling feverish, chills, headache, congestion, dry cough, malaise, body aches, weakness, rhinorrhea, + sore throat. Reports some shortness of breath last night with upper back pain.  Tolerating po.  Denies rash, N/V/D.   Did receive flu vaccine this season.  unknown influenza exposure. unknown COVID exposure.  Treatment tried: Tylenol  Travel outside of [place] in past 3 weeks: no        Current Outpatient Medications   Medication Sig Dispense Refill    oseltamivir 75 MG Oral Cap Take 1 capsule (75 mg total) by mouth 2 (two) times daily for 5 days. 10 capsule 0    Norethin Ace-Eth Estrad-FE 1-20 MG-MCG Oral Tab Take 1 tablet by mouth daily. 28 tablet 12    buPROPion  MG Oral Tablet 24 Hr Take 1 tablet (150 mg total) by mouth daily. 30 tablet 0      Past Medical History:    High serum low density lipoprotein (LDL) cholesterol    High triglycerides    Hypovitaminosis D    Migraine without aura and without status migrainosus, not intractable      Past Surgical History:   Procedure Laterality Date            Family History   Problem Relation Age of Onset    Diabetes Father     Diabetes Maternal Grandfather       Social History     Socioeconomic History    Marital status:    Tobacco Use    Smoking status: Never     Passive exposure: Never    Smokeless tobacco: Never   Vaping Use    Vaping status: Never Used   Substance and Sexual Activity    Alcohol use: No     Alcohol/week: 0.0 standard drinks of alcohol    Drug use: No    Sexual activity: Yes     Partners: Male         REVIEW OF  SYSTEMS:   GENERAL: see HPI  SKIN: no rashes  EYES:denies blurred vision or double vision  HEENT: congested; see HPI  CHEST: no chest pains, palpitations.  LUNGS: denies shortness of breath or wheezing.  CARDIOVASCULAR: denies chest pain.  GI: no abdominal pain; see HPI  URO: no decreased urination.      EXAM:   /80   Pulse 94   Temp 98.6 °F (37 °C) (Oral)   Resp 16   Ht 5' 3\" (1.6 m)   Wt 148 lb (67.1 kg)   LMP 02/09/2025 (Exact Date)   SpO2 99%   BMI 26.22 kg/m²   GENERAL: well developed, well nourished, in no apparent distress, acutely sick.  SKIN: no rashes,no suspicious lesions, flushed.  Warm to touch.  HEAD: atraumatic, normocephalic,  no tenderness on palpation of sinuses  EYES: conjunctiva clear  EARS: TM's clear gray, no bulging, no retraction, no fluid, bony landmarks visible  NOSE: nostrils patent, clear nasal mucous, nasal mucosa reddened and swollen  THROAT: oral mucosa pink, moist. Posterior pharynx is not erythematous. no exudates.  NECK: supple, non-tender  LUNGS: clear to auscultation bilaterally, no wheezes or rhonchi. Breathing is non labored.  Dry cough.  CARDIO: RRR without murmur  GI: good BS's,no masses, HSM or tenderness  EXTREMITIES: no cyanosis, clubbing or edema  LYMPH:  no cervical lymphadenopathy.      Narrative   PROCEDURE:  XR CHEST PA + LAT CHEST (CPT=71046)     INDICATIONS:  R05.9 Cough, unspecified type     COMPARISON:  [facility] , XR, XR CHEST PA + LAT CHEST (WKY=[id number]), 3/26/2019, 11:01 AM.     TECHNIQUE:  PA and lateral chest radiographs were obtained.     PATIENT STATED HISTORY: (As transcribed by Technologist)  Patient complains of chest pain and cough starting 1 day ago         FINDINGS:    LUNGS:  No focal consolidation.  Normal vascularity.  CARDIAC:  Normal size cardiac silhouette.  MEDIASTINUM:  Normal.  PLEURA:  Normal.  No pleural effusions.  BONES:  Normal for age.                   Impression   CONCLUSION:  No acute radiographic findings.        LOCATION:   [id number]        Dictated by (CST): Stefan Baker MD on 3/18/2025 at 10:46 AM      Finalized by (CST): Stefan Baker MD on 3/18/2025 at 10:46 AM         ASSESSMENT AND PLAN:   Abisai Leggett is a 29 year old female who presents with flu-like symptoms    ASSESSMENT:  Encounter Diagnoses   Name Primary?    Cough, unspecified type     Influenza-like illness Yes    Shortness of breath          PLAN:  CXR negative.  Quad panel sent. Natural course of influenza, possible complications, CDC recommendations, and treatment options discussed.  Patient has elected to start Tamiflu after discussion of risks and benefits. Explained may lessen severity and duration of symptoms, but will not completely remove symptoms.    Rest, increase fluids,ibuprofen/tylenol q 6 hours for fever/aches prn.   Discussed OTC options for symptom relief.    Complications of influenza discussed including secondary infections such as AOM, bronchitis, PNA, sinusitis.  To be rechecked if exhibiting any symptoms of these illnesses.   To f/u with PCP in 3-4 days if sx's persist. Seek immediate medical attention for acute or worsening symptoms.   Verbalizes understanding of these issues and agrees to the plan.    Meds & Refills for this Visit:  Requested Prescriptions     Signed Prescriptions Disp Refills    oseltamivir 75 MG Oral Cap 10 capsule 0     Sig: Take 1 capsule (75 mg total) by mouth 2 (two) times daily for 5 days.         Patient Instructions   I recommend the 4 H's for inflammation:    1. Heat (warm mist from the shower or warm liquids such as tea)  2. Honey (mixed in your tea or by the spoonful [if you are not diabetic; over the age of 1 year]--take a spoonful 3 times a day and don't eat or drink anything for 15-20 minutes)  3. Humidity--cool mist in the bedroom at night  4. Hydration --at least 8 -10 glasses a day

## 2025-03-19 LAB
FLUAV + FLUBV RNA SPEC NAA+PROBE: DETECTED
FLUAV + FLUBV RNA SPEC NAA+PROBE: NOT DETECTED
RSV RNA SPEC NAA+PROBE: NOT DETECTED
SARS-COV-2 RNA RESP QL NAA+PROBE: NOT DETECTED

## 2025-04-16 NOTE — PROGRESS NOTES
Chief Complaint   Patient presents with    Fatigue     Patient c/o fatigue, hair loss x one month        HPI:    Pt is here for physical   She reports fatigue, weight gain,acne,hair loss,  and oily skin for the past few months.  She reports eating less sugar.   Has gradual weight gain.  known hx of PCOS.   Saw gyn in December and had blood work done.     She had irregular periods couple months ago. Periods are controlled usually. She started ocp three weeks ago. She joanie to gyn in [place] and got refill for metformin, needs new refill. She tried metformin twice a day and stopped it because she felt her sugar was getting low and got dizzy. She is tolerating once a day well.     She has severe vaginismus. Unable to do pap. Using lube for intercourse.     A1c is 5.9. she was diagnosed with pcos with GYN.       Due for pap smear. Denies any hx of abnormal pap smears. Reports pain with intercourse. Denies urinary burning, frequency. Reports cloudy urine.   Denies fam hx of PCOS, endometriosis        Fam hx of DM in father and maternal grandfather.    PMHx: ESBL UTI - she does have recurrent UTI. Last UTI was in April   Smoking: none  Alcohol: none   Drugs: none   Sexual hx: 1 partner   STD hx: declined  Occupation: homemaker   Mammogram: There is no breast cancer in the family  Colonoscopy: There is no colon cancer in the family   Pap smear: 2020, reports pain with exam; saw gyn and tried three times to have pap smear. She has tight vaginal muscles.   Tdap: utd   Diet: Healthy diet             Review of Systems   Constitutional: Negative for fever, chills  . No distress.  HENT: Negative for hearing loss, congestion, sore throat, neck pain and dental problem.    Eyes: Negative for pain and visual disturbance.   Respiratory: Negative for cough, chest tightness, shortness of breath and wheezing.    Cardiovascular: Negative for chest pain, palpitations and leg swelling.   Gastrointestinal: Negative for nausea, vomiting,  abdominal pain, diarrhea, blood in stool and abdominal distention.   Genitourinary: Negative for dysuria, hematuria and difficulty urinating.        Patient Active Problem List   Diagnosis    Mass of left wrist    Dermatitis    Ganglion cyst of volar aspect of left wrist    Stress due to marital problems    Migraine without aura and without status migrainosus, not intractable    Pyelonephritis    Renal abscess    Hypovitaminosis D    High triglycerides    High serum low density lipoprotein (LDL) cholesterol       Past Medical History:    High serum low density lipoprotein (LDL) cholesterol    High triglycerides    Hypovitaminosis D    Migraine without aura and without status migrainosus, not intractable     Past Surgical History:   Procedure Laterality Date           Family History   Problem Relation Age of Onset    Diabetes Father     Diabetes Maternal Grandfather      Social History     Socioeconomic History    Marital status:    Tobacco Use    Smoking status: Never     Passive exposure: Never    Smokeless tobacco: Never   Vaping Use    Vaping status: Never Used   Substance and Sexual Activity    Alcohol use: No     Alcohol/week: 0.0 standard drinks of alcohol    Drug use: No    Sexual activity: Yes     Partners: Male       Current Outpatient Medications   Medication Sig Dispense Refill    Orlistat 120 MG Oral Cap Take 1 capsule (120 mg total) by mouth in the morning. Rx'd outside [place] .      Inositol-D Chiro-Inositol (OVASITOL OR) Take 68 kg by mouth in the morning. Rx'd outside [place] .      metFORMIN  MG Oral Tablet 24 Hr Take 1 tablet (750 mg total) by mouth daily with breakfast. 90 tablet 2    Norethin Ace-Eth Estrad-FE 1-20 MG-MCG Oral Tab Take 1 tablet by mouth daily. 28 tablet 12       Allergies  No Known Allergies    Health Maintenance  Immunizations:  Immunization History   Administered Date(s) Administered    Covid-19 Vaccine Pfizer 30 mcg/0.3 ml 05/15/2021, 2021    Fluvirin, 3  Years & >, Im 10/23/2014    Hep B, Unspecified Formulation 08/08/2014    Influenza 11/04/2024    TDAP 06/29/2017   Deferred Date(s) Deferred    FLULAVAL 6 months & older 0.5 ml Prefilled syringe (42362) 09/28/2020, 11/10/2020    FLUZONE 6 months and older PFS 0.5 ml ([id number]) 09/22/2023         Physical Exam  /80   Pulse 87   Temp 97.4 °F (36.3 °C) (Temporal)   Resp 16   Ht 5' 3\" (1.6 m)   Wt 151 lb (68.5 kg)   LMP 03/19/2025 (Exact Date)   SpO2 99%   BMI 26.75 kg/m²   Constitutional: Oriented to person, place, and time. No distress.   HEENT:  Normocephalic and atraumatic. Hearing and tympanic membranes normal.  Nose normal. Oropharynx is clear and moist.   Eyes: PERRLA. No scleral icterus.   Neck: No thyromegaly   Cardiovascular: Normal rate, regular rhythm and intact distal pulses.  No murmur, rubs or gallops.   Pulmonary/Chest: Effort normal and breath sounds normal. No respiratory distress. No wheezes, rhonchi or rales  Abdominal: Soft. Bowel sounds are normal. Non tender, no masses, no organomegaly or hernias.  Psychiatric: Normal mood and affect.       A/P:    Encounter Diagnoses   Name Primary?    Fatigue, unspecified type     Hair loss     Well adult exam Yes    Hyperandrogenism     High triglycerides     High serum low density lipoprotein (LDL) cholesterol     Cloudy urine     Vaginismus     Hyperglycemia     Prediabetes      1. Fatigue, unspecified type  R/o vit deficiency   Start taking iron supplement otc for hair loss.   - TSH W Reflex To Free T4; Future  - Vitamin D; Future  - Ferritin; Future  - Vitamin B12; Future    2. Hair loss  Can try otc iron, otherwise may consider rogaine  - Vitamin D; Future    3. Well adult exam  - -Discussed diet and exercise, counseled on vaccine and screening guidelines.   - CBC With Differential With Platelet; Future  - Comp Metabolic Panel (14); Future  - Lipid Panel; Future    4. Hyperandrogenism  Continue ocp    5. High triglycerides  FLP ordered.     6.  High serum low density lipoprotein (LDL) cholesterol  FLP ordered     7. Cloudy urine  No UTI symptoms.   UA ordered.   - UA/M With Culture Reflex [E]; Future    8. Vaginismus  Referred to PT for pelvic floor therapy.   - Physical Therapy Referral - [facility]    9. Hyperglycemia  Last A1c value was 5.7 in December  Continue on metformin.   - metFORMIN  MG Oral Tablet 24 Hr; Take 1 tablet (750 mg total) by mouth daily with breakfast.  Dispense: 90 tablet; Refill: 2    10. Prediabetes  - metFORMIN  MG Oral Tablet 24 Hr; Take 1 tablet (750 mg total) by mouth daily with breakfast.  Dispense: 90 tablet; Refill: 2  - Hemoglobin A1C [E]; Future      Orders Placed This Encounter   Procedures    CBC With Differential With Platelet    Comp Metabolic Panel (14)    Lipid Panel    TSH W Reflex To Free T4    Vitamin D    Ferritin    Vitamin B12    UA/M With Culture Reflex [E]    Hemoglobin A1C [E]       Meds & Refills for this Visit:  Requested Prescriptions     Signed Prescriptions Disp Refills    metFORMIN  MG Oral Tablet 24 Hr 90 tablet 2     Sig: Take 1 tablet (750 mg total) by mouth daily with breakfast.       Imaging & Consults:  OP REFERRAL TO [facility]      No follow-ups on file.    There are no Patient Instructions on file for this visit.    All questions were answered and the patient understands the plan.

## 2025-04-18 ENCOUNTER — OFFICE VISIT (OUTPATIENT)
Dept: FAMILY MEDICINE CLINIC | Facility: CLINIC | Age: 30
End: 2025-04-18
Payer: COMMERCIAL

## 2025-04-18 VITALS
HEART RATE: 87 BPM | HEIGHT: 63 IN | BODY MASS INDEX: 26.75 KG/M2 | RESPIRATION RATE: 16 BRPM | OXYGEN SATURATION: 99 % | WEIGHT: 151 LBS | TEMPERATURE: 97 F | SYSTOLIC BLOOD PRESSURE: 110 MMHG | DIASTOLIC BLOOD PRESSURE: 80 MMHG

## 2025-04-18 DIAGNOSIS — E28.8 HYPERANDROGENISM: ICD-10-CM

## 2025-04-18 DIAGNOSIS — E78.1 HIGH TRIGLYCERIDES: ICD-10-CM

## 2025-04-18 DIAGNOSIS — Z00.00 WELL ADULT EXAM: Primary | ICD-10-CM

## 2025-04-18 DIAGNOSIS — R82.90 CLOUDY URINE: ICD-10-CM

## 2025-04-18 DIAGNOSIS — R73.9 HYPERGLYCEMIA: ICD-10-CM

## 2025-04-18 DIAGNOSIS — R79.89 HIGH SERUM LOW DENSITY LIPOPROTEIN (LDL) CHOLESTEROL: ICD-10-CM

## 2025-04-18 DIAGNOSIS — R53.83 FATIGUE, UNSPECIFIED TYPE: ICD-10-CM

## 2025-04-18 DIAGNOSIS — L65.9 HAIR LOSS: ICD-10-CM

## 2025-04-18 DIAGNOSIS — R73.03 PREDIABETES: ICD-10-CM

## 2025-04-18 DIAGNOSIS — N94.2 VAGINISMUS: ICD-10-CM

## 2025-04-18 RX ORDER — ORLISTAT 120 MG/1
120 CAPSULE ORAL DAILY
COMMUNITY

## 2025-04-18 RX ORDER — METFORMIN HYDROCHLORIDE 750 MG/1
750 TABLET, EXTENDED RELEASE ORAL
Qty: 90 TABLET | Refills: 2 | Status: SHIPPED | OUTPATIENT
Start: 2025-04-18

## 2025-04-18 RX ORDER — METFORMIN HYDROCHLORIDE 750 MG/1
750 TABLET, EXTENDED RELEASE ORAL
COMMUNITY
End: 2025-04-18

## 2025-04-19 ENCOUNTER — LAB ENCOUNTER (OUTPATIENT)
Dept: LAB | Age: 30
End: 2025-04-19
Attending: FAMILY MEDICINE
Payer: COMMERCIAL

## 2025-04-19 DIAGNOSIS — R73.03 PREDIABETES: ICD-10-CM

## 2025-04-19 DIAGNOSIS — R82.90 CLOUDY URINE: ICD-10-CM

## 2025-04-19 DIAGNOSIS — Z00.00 WELL ADULT EXAM: ICD-10-CM

## 2025-04-19 DIAGNOSIS — R53.83 FATIGUE, UNSPECIFIED TYPE: ICD-10-CM

## 2025-04-19 DIAGNOSIS — L65.9 HAIR LOSS: ICD-10-CM

## 2025-04-19 LAB
ALBUMIN SERPL-MCNC: 4.7 G/DL (ref 3.2–4.8)
ALBUMIN/GLOB SERPL: 1.7 {RATIO} (ref 1–2)
ALP LIVER SERPL-CCNC: 85 U/L (ref 37–98)
ALT SERPL-CCNC: 20 U/L (ref 10–49)
ANION GAP SERPL CALC-SCNC: 12 MMOL/L (ref 0–18)
AST SERPL-CCNC: 18 U/L (ref ?–34)
BASOPHILS # BLD AUTO: 0.02 X10(3) UL (ref 0–0.2)
BASOPHILS NFR BLD AUTO: 0.2 %
BILIRUB SERPL-MCNC: 0.3 MG/DL (ref 0.3–1.2)
BILIRUB UR QL STRIP.AUTO: NEGATIVE
BUN BLD-MCNC: 10 MG/DL (ref 9–23)
CALCIUM BLD-MCNC: 9.6 MG/DL (ref 8.7–10.6)
CHLORIDE SERPL-SCNC: 106 MMOL/L (ref 98–112)
CHOLEST SERPL-MCNC: 147 MG/DL (ref ?–200)
CLARITY UR REFRACT.AUTO: CLEAR
CO2 SERPL-SCNC: 22 MMOL/L (ref 21–32)
CREAT BLD-MCNC: 0.69 MG/DL (ref 0.55–1.02)
DEPRECATED HBV CORE AB SER IA-ACNC: 16 NG/ML (ref 50–306)
EGFRCR SERPLBLD CKD-EPI 2021: 120 ML/MIN/1.73M2 (ref 60–?)
EOSINOPHIL # BLD AUTO: 0.47 X10(3) UL (ref 0–0.7)
EOSINOPHIL NFR BLD AUTO: 5.2 %
ERYTHROCYTE [DISTWIDTH] IN BLOOD BY AUTOMATED COUNT: 12.4 %
EST. AVERAGE GLUCOSE BLD GHB EST-MCNC: 117 MG/DL (ref 68–126)
FASTING PATIENT LIPID ANSWER: YES
FASTING STATUS PATIENT QL REPORTED: YES
GLOBULIN PLAS-MCNC: 2.7 G/DL (ref 2–3.5)
GLUCOSE BLD-MCNC: 94 MG/DL (ref 70–99)
GLUCOSE UR STRIP.AUTO-MCNC: NORMAL MG/DL
HBA1C MFR BLD: 5.7 % (ref ?–5.7)
HCT VFR BLD AUTO: 41.6 % (ref 35–48)
HDLC SERPL-MCNC: 36 MG/DL (ref 40–59)
HGB BLD-MCNC: 14 G/DL (ref 12–16)
IMM GRANULOCYTES # BLD AUTO: 0.02 X10(3) UL (ref 0–1)
IMM GRANULOCYTES NFR BLD: 0.2 %
KETONES UR STRIP.AUTO-MCNC: NEGATIVE MG/DL
LDLC SERPL CALC-MCNC: 90 MG/DL (ref ?–100)
LEUKOCYTE ESTERASE UR QL STRIP.AUTO: NEGATIVE
LYMPHOCYTES # BLD AUTO: 4.55 X10(3) UL (ref 1–4)
LYMPHOCYTES NFR BLD AUTO: 50.4 %
MCH RBC QN AUTO: 28.7 PG (ref 26–34)
MCHC RBC AUTO-ENTMCNC: 33.7 G/DL (ref 31–37)
MCV RBC AUTO: 85.2 FL (ref 80–100)
MONOCYTES # BLD AUTO: 0.56 X10(3) UL (ref 0.1–1)
MONOCYTES NFR BLD AUTO: 6.2 %
NEUTROPHILS # BLD AUTO: 3.41 X10 (3) UL (ref 1.5–7.7)
NEUTROPHILS # BLD AUTO: 3.41 X10(3) UL (ref 1.5–7.7)
NEUTROPHILS NFR BLD AUTO: 37.8 %
NITRITE UR QL STRIP.AUTO: NEGATIVE
NONHDLC SERPL-MCNC: 111 MG/DL (ref ?–130)
OSMOLALITY SERPL CALC.SUM OF ELEC: 289 MOSM/KG (ref 275–295)
PH UR STRIP.AUTO: 7 [PH] (ref 5–8)
PLATELET # BLD AUTO: 268 10(3)UL (ref 150–450)
POTASSIUM SERPL-SCNC: 4.3 MMOL/L (ref 3.5–5.1)
PROT SERPL-MCNC: 7.4 G/DL (ref 5.7–8.2)
PROT UR STRIP.AUTO-MCNC: NEGATIVE MG/DL
RBC # BLD AUTO: 4.88 X10(6)UL (ref 3.8–5.3)
RBC UR QL AUTO: NEGATIVE
SODIUM SERPL-SCNC: 140 MMOL/L (ref 136–145)
SP GR UR STRIP.AUTO: 1.02 (ref 1–1.03)
TRIGL SERPL-MCNC: 116 MG/DL (ref 30–149)
TSI SER-ACNC: 1.23 UIU/ML (ref 0.55–4.78)
UROBILINOGEN UR STRIP.AUTO-MCNC: NORMAL MG/DL
VIT B12 SERPL-MCNC: 217 PG/ML (ref 211–911)
VIT D+METAB SERPL-MCNC: 17.4 NG/ML (ref 30–100)
VLDLC SERPL CALC-MCNC: 19 MG/DL (ref 0–30)
WBC # BLD AUTO: 9 X10(3) UL (ref 4–11)

## 2025-04-19 PROCEDURE — 85025 COMPLETE CBC W/AUTO DIFF WBC: CPT

## 2025-04-19 PROCEDURE — 82607 VITAMIN B-12: CPT

## 2025-04-19 PROCEDURE — 82728 ASSAY OF FERRITIN: CPT

## 2025-04-19 PROCEDURE — 81003 URINALYSIS AUTO W/O SCOPE: CPT

## 2025-04-19 PROCEDURE — 84443 ASSAY THYROID STIM HORMONE: CPT

## 2025-04-19 PROCEDURE — 82306 VITAMIN D 25 HYDROXY: CPT

## 2025-04-19 PROCEDURE — 80053 COMPREHEN METABOLIC PANEL: CPT

## 2025-04-19 PROCEDURE — 80061 LIPID PANEL: CPT

## 2025-04-19 PROCEDURE — 83036 HEMOGLOBIN GLYCOSYLATED A1C: CPT

## 2025-04-19 PROCEDURE — 36415 COLL VENOUS BLD VENIPUNCTURE: CPT

## 2025-05-05 ENCOUNTER — OFFICE VISIT (OUTPATIENT)
Dept: FAMILY MEDICINE CLINIC | Facility: CLINIC | Age: 30
End: 2025-05-05
Payer: COMMERCIAL

## 2025-05-05 VITALS
WEIGHT: 150.19 LBS | HEART RATE: 96 BPM | DIASTOLIC BLOOD PRESSURE: 80 MMHG | RESPIRATION RATE: 16 BRPM | SYSTOLIC BLOOD PRESSURE: 108 MMHG | TEMPERATURE: 98 F | BODY MASS INDEX: 27 KG/M2 | OXYGEN SATURATION: 98 %

## 2025-05-05 DIAGNOSIS — R39.9 URINARY SYMPTOM OR SIGN: Primary | ICD-10-CM

## 2025-05-05 LAB
BILIRUBIN: NEGATIVE
GLUCOSE (URINE DIPSTICK): NEGATIVE MG/DL
KETONES (URINE DIPSTICK): NEGATIVE MG/DL
MULTISTIX LOT#: ABNORMAL NUMERIC
NITRITE, URINE: NEGATIVE
PH, URINE: 6 (ref 4.5–8)
PROTEIN (URINE DIPSTICK): NEGATIVE MG/DL
SPECIFIC GRAVITY: 1.02 (ref 1–1.03)
URINE-COLOR: YELLOW
UROBILINOGEN,SEMI-QN: 0.2 MG/DL (ref 0–1.9)

## 2025-05-05 PROCEDURE — 87086 URINE CULTURE/COLONY COUNT: CPT | Performed by: NURSE PRACTITIONER

## 2025-05-05 RX ORDER — PHENAZOPYRIDINE HYDROCHLORIDE 200 MG/1
200 TABLET, FILM COATED ORAL 3 TIMES DAILY PRN
Qty: 30 TABLET | Refills: 0 | Status: SHIPPED | OUTPATIENT
Start: 2025-05-05 | End: 2025-05-15

## 2025-05-05 RX ORDER — SULFAMETHOXAZOLE AND TRIMETHOPRIM 800; 160 MG/1; MG/1
1 TABLET ORAL 2 TIMES DAILY
Qty: 10 TABLET | Refills: 0 | Status: SHIPPED | OUTPATIENT
Start: 2025-05-05 | End: 2025-05-10

## 2025-05-05 NOTE — PROGRESS NOTES
Abisai Leggett is a 29 year old female.  HPI:   Patient presents with urinary symptoms. Complaining of urinary frequency, urgency, dysuria, suprapubic pain, mild back pain. Denies flank pain, fever, hematuria.  Duration: since last night  Sexual activity: with partner yesterday  Vaginal discharge: denies  Treatments tried: Macrobid this morning.      Current Medications[1]   Past Medical History[2]   Social History:  Short Social Hx on File[3]      REVIEW OF SYSTEMS:   GENERAL HEALTH: feels well otherwise  SKIN: denies any unusual skin lesions or rashes  RESPIRATORY: denies shortness of breath with exertion  CARDIOVASCULAR: denies chest pain on exertion  GI: denies nausea or vomiting  : denies hematuria  NEURO: denies headaches  MUSCULOSKELETAL:  denies CVA tenderness, noted mild low back pain    EXAM:   /80   Pulse 96   Temp 97.9 °F (36.6 °C) (Oral)   Resp 16   Wt 150 lb 3.2 oz (68.1 kg)   LMP 04/29/2025 (Exact Date)   SpO2 98%   BMI 26.61 kg/m²   GENERAL: well developed, well nourished,in no apparent distress  CARDIOVASCULAR:  Normal rate, normal S1, S2  SKIN: no rashes,no suspicious lesions  MUSCULOSKELETAL:  No CVA tenderness  GI/:  abd soft, bladder soft and nondistended. mild suprapubic tenderness  PSYCHIATRIC:  Alert and oriented x 3.  Results for orders placed or performed in visit on 05/05/25   URINALYSIS, AUTO, W/O SCOPE    Collection Time: 05/05/25 10:06 AM   Result Value Ref Range    Glucose Urine Negative Negative mg/dL    Bilirubin Urine Negative Negative    Ketones, UA Negative Negative - Trace mg/dL    Spec Gravity 1.020 1.005 - 1.030    Blood Urine Small (A) Negative    PH Urine 6.0 5.0 - 8.0    Protein Urine Negative Negative - Trace mg/dL    Urobilinogen Urine 0.2 0.2 - 1.0 mg/dL    Nitrite Urine Negative Negative    Leukocyte Esterase Urine Small (A) Negative    APPEARANCE Cloudy Clear    Color Urine Yellow Yellow    Multistix Lot# [id number] Numeric    Multistix Expiration Date  2/28/2026 Date         ASSESSMENT AND PLAN:   UTI - Urine dip reviewed, antibiotics (see orders for specific medications).  Urine sent for culture and sensitivity will adjust antibiotic if needed. Verbal and written instructions given for patient care.  Patient will follow up with primary care if not improving and seek immediate care for worsening symptoms.  Patient verbalized understanding and agrees to plan.   Patient Instructions   Please start the antibiotic as discussed. We will send a urine culture and advise you if a change is needed in your antibiotic.  You may take Pyridium one tablet by mouth every 8 hours as needed for pain. Do not use more than three days. This medication will turn your urine orange and stain items urine touches. You may only need one or two doses of medication before the antibiotic begins to work.  Keep the Pyridium for future use-but do not take in the eight hours prior to being evaluated or we can not test the urine in the office.  Wipe from front to back after using the bathroom every time. Consider wet wipes for cleaning.  Change your pad frequently during your menses.  If sexually active urinate before and after sexual intercourse and wipe front to back.  Stay well hydrated, wear cotton underwear to decrease the spread of E. Coli to the urethra.  Cranberry extract may help make bacteria less likely to live in the bladder. Consider supplements during illness or regularly if frequent infections occur.  Please follow up with your primary care provider if not improved despite treatment. Seek immediate care for worsening symptoms.               [1]   Current Outpatient Medications   Medication Sig Dispense Refill    ergocalciferol 1.25 MG (75400 UT) Oral Cap Take 1 capsule (50,000 Units total) by mouth once a week. 12 capsule 0    Ferrous Sulfate 325 (65 Fe) MG Oral Tab Take 1 tablet (325 mg total) by mouth daily with breakfast. 60 tablet 0    Orlistat 120 MG Oral Cap Take 1 capsule  (120 mg total) by mouth in the morning. Rx'd outside [place] .      Inositol-D Chiro-Inositol (OVASITOL OR) Take 68 kg by mouth in the morning. Rx'd outside [place] .      metFORMIN  MG Oral Tablet 24 Hr Take 1 tablet (750 mg total) by mouth daily with breakfast. 90 tablet 2    Norethin Ace-Eth Estrad-FE 1-20 MG-MCG Oral Tab Take 1 tablet by mouth daily. 28 tablet 12   [2]   Past Medical History:   High serum low density lipoprotein (LDL) cholesterol    High triglycerides    Hypovitaminosis D    Migraine without aura and without status migrainosus, not intractable   [3]   Social History  Socioeconomic History    Marital status:    Tobacco Use    Smoking status: Never     Passive exposure: Never    Smokeless tobacco: Never   Vaping Use    Vaping status: Never Used   Substance and Sexual Activity    Alcohol use: No     Alcohol/week: 0.0 standard drinks of alcohol    Drug use: No    Sexual activity: Yes     Partners: Male

## 2025-05-05 NOTE — PATIENT INSTRUCTIONS
Please start the antibiotic as discussed. We will send a urine culture and advise you if a change is needed in your antibiotic.  You may take Pyridium one tablet by mouth every 8 hours as needed for pain. Do not use more than three days. This medication will turn your urine orange and stain items urine touches. You may only need one or two doses of medication before the antibiotic begins to work.  Keep the Pyridium for future use-but do not take in the eight hours prior to being evaluated or we can not test the urine in the office.  Wipe from front to back after using the bathroom every time. Consider wet wipes for cleaning.  Change your pad frequently during your menses.  If sexually active urinate before and after sexual intercourse and wipe front to back.  Stay well hydrated, wear cotton underwear to decrease the spread of E. Coli to the urethra.  Cranberry extract may help make bacteria less likely to live in the bladder. Consider supplements during illness or regularly if frequent infections occur.  Please follow up with your primary care provider if not improved despite treatment. Seek immediate care for worsening symptoms.

## 2025-06-09 ENCOUNTER — TELEPHONE (OUTPATIENT)
Dept: FAMILY MEDICINE CLINIC | Facility: CLINIC | Age: 30
End: 2025-06-09

## 2025-06-09 NOTE — TELEPHONE ENCOUNTER
Patient called and said she has a possible allergy and having headaches all of a sudden.  She wants to know if she can get in ASAP.

## 2025-06-10 ENCOUNTER — LAB ENCOUNTER (OUTPATIENT)
Dept: LAB | Age: 30
End: 2025-06-10
Attending: FAMILY MEDICINE
Payer: COMMERCIAL

## 2025-06-10 ENCOUNTER — OFFICE VISIT (OUTPATIENT)
Dept: FAMILY MEDICINE CLINIC | Facility: CLINIC | Age: 30
End: 2025-06-10
Payer: COMMERCIAL

## 2025-06-10 VITALS
WEIGHT: 150 LBS | TEMPERATURE: 98 F | DIASTOLIC BLOOD PRESSURE: 68 MMHG | SYSTOLIC BLOOD PRESSURE: 100 MMHG | RESPIRATION RATE: 16 BRPM | OXYGEN SATURATION: 99 % | HEIGHT: 63 IN | BODY MASS INDEX: 26.58 KG/M2 | HEART RATE: 89 BPM

## 2025-06-10 DIAGNOSIS — G43.719 INTRACTABLE CHRONIC MIGRAINE WITHOUT AURA AND WITHOUT STATUS MIGRAINOSUS: ICD-10-CM

## 2025-06-10 DIAGNOSIS — E53.8 LOW SERUM VITAMIN B12: ICD-10-CM

## 2025-06-10 DIAGNOSIS — R21 SKIN RASH: Primary | ICD-10-CM

## 2025-06-10 DIAGNOSIS — L50.3 DERMATOGRAPHISM: ICD-10-CM

## 2025-06-10 DIAGNOSIS — R21 SKIN RASH: ICD-10-CM

## 2025-06-10 DIAGNOSIS — R79.0 LOW FERRITIN: ICD-10-CM

## 2025-06-10 LAB
BASOPHILS # BLD AUTO: 0.04 X10(3) UL (ref 0–0.2)
BASOPHILS NFR BLD AUTO: 0.4 %
DEPRECATED HBV CORE AB SER IA-ACNC: 22 NG/ML (ref 50–306)
EOSINOPHIL # BLD AUTO: 0.5 X10(3) UL (ref 0–0.7)
EOSINOPHIL NFR BLD AUTO: 4.7 %
ERYTHROCYTE [DISTWIDTH] IN BLOOD BY AUTOMATED COUNT: 12.5 %
HCT VFR BLD AUTO: 42.8 % (ref 35–48)
HGB BLD-MCNC: 14.6 G/DL (ref 12–16)
IMM GRANULOCYTES # BLD AUTO: 0.02 X10(3) UL (ref 0–1)
IMM GRANULOCYTES NFR BLD: 0.2 %
LYMPHOCYTES # BLD AUTO: 4.8 X10(3) UL (ref 1–4)
LYMPHOCYTES NFR BLD AUTO: 45.2 %
MCH RBC QN AUTO: 29.1 PG (ref 26–34)
MCHC RBC AUTO-ENTMCNC: 34.1 G/DL (ref 31–37)
MCV RBC AUTO: 85.3 FL (ref 80–100)
MONOCYTES # BLD AUTO: 0.62 X10(3) UL (ref 0.1–1)
MONOCYTES NFR BLD AUTO: 5.8 %
NEUTROPHILS # BLD AUTO: 4.63 X10 (3) UL (ref 1.5–7.7)
NEUTROPHILS # BLD AUTO: 4.63 X10(3) UL (ref 1.5–7.7)
NEUTROPHILS NFR BLD AUTO: 43.7 %
PLATELET # BLD AUTO: 348 10(3)UL (ref 150–450)
RBC # BLD AUTO: 5.02 X10(6)UL (ref 3.8–5.3)
VIT B12 SERPL-MCNC: >2000 PG/ML (ref 211–911)
WBC # BLD AUTO: 10.6 X10(3) UL (ref 4–11)

## 2025-06-10 PROCEDURE — 85025 COMPLETE CBC W/AUTO DIFF WBC: CPT

## 2025-06-10 PROCEDURE — 36415 COLL VENOUS BLD VENIPUNCTURE: CPT

## 2025-06-10 PROCEDURE — 82728 ASSAY OF FERRITIN: CPT

## 2025-06-10 PROCEDURE — 99214 OFFICE O/P EST MOD 30 MIN: CPT | Performed by: FAMILY MEDICINE

## 2025-06-10 PROCEDURE — 82785 ASSAY OF IGE: CPT

## 2025-06-10 PROCEDURE — 86003 ALLG SPEC IGE CRUDE XTRC EA: CPT

## 2025-06-10 PROCEDURE — 82607 VITAMIN B-12: CPT

## 2025-06-10 PROCEDURE — 96372 THER/PROPH/DIAG INJ SC/IM: CPT | Performed by: FAMILY MEDICINE

## 2025-06-10 RX ORDER — MECLIZINE HYDROCHLORIDE 25 MG/1
25 TABLET ORAL 3 TIMES DAILY PRN
Qty: 30 TABLET | Refills: 0 | Status: SHIPPED | OUTPATIENT
Start: 2025-06-10 | End: 2025-06-20

## 2025-06-10 RX ORDER — MECLIZINE HYDROCHLORIDE 25 MG/1
25 TABLET ORAL 3 TIMES DAILY PRN
COMMUNITY
End: 2025-06-10

## 2025-06-10 RX ORDER — CYANOCOBALAMIN 1000 UG/ML
1000 INJECTION, SOLUTION INTRAMUSCULAR; SUBCUTANEOUS
Status: SHIPPED | OUTPATIENT
Start: 2025-06-10 | End: 2025-09-08

## 2025-06-10 RX ORDER — NITROFURANTOIN 25; 75 MG/1; MG/1
100 CAPSULE ORAL 2 TIMES DAILY
COMMUNITY

## 2025-06-10 RX ADMIN — CYANOCOBALAMIN 1000 MCG: 1000 INJECTION, SOLUTION INTRAMUSCULAR; SUBCUTANEOUS at 14:47:00

## 2025-06-10 NOTE — TELEPHONE ENCOUNTER
Future Appointments   Date Time Provider Department Center   6/10/2025  1:30 PM Dewey Vang,  [facility] [address]

## 2025-06-10 NOTE — PROGRESS NOTES
Subjective:   Abisai Leggett is a 29 year old female who presents for Headache (Patient c/o headache and dizziness x 2 months - patient states these symptoms are worsening  /Skin has welts when scratched intermittently )       History/Other:   History of Present Illness  Abisai Leggett is a 29 year old female who presents with dizziness and headaches for two months.    She has been experiencing dizziness characterized by a sensation of the room spinning, even while sitting still, which is exacerbated by motion such as moving quickly or watching her children move. Episodes last from minutes to hours and have been increasing in frequency and duration. She feels lightheaded but has not experienced fainting.    The headaches are located behind the eye, sometimes affecting one side and then switching to the other, with pressure in the temples. The pain is severe and occurs daily, sometimes in the morning or evening, and is associated with dizziness. No visual disturbances, nausea, vomiting, or neck pain. She has not been previously treated for these symptoms and has not been diagnosed with migraines or vertigo. Tylenol provides minimal relief. She has been on vitamin D and iron supplements for six weeks.    She reports experiencing pressure and stress, particularly when planning activities or dealing with her children, which seems to trigger her symptoms. She denies any recent falls or accidents.    Additionally, she reports a new onset of skin reactions, describing red, itchy spots that appear after scratching, which she has not treated with any medication.   Chief Complaint Reviewed and Verified  Nursing Notes Reviewed and   Verified  Tobacco Reviewed  Allergies Reviewed  Medications Reviewed    OB Status Reviewed         Tobacco:  She has never smoked tobacco.    Current Medications[1]           Review of Systems:  Pertinent items are noted in HPI.      Objective:   /68   Pulse 89   Temp 97.7 °F (36.5 °C)  (Temporal)   Resp 16   Ht 5' 3\" (1.6 m)   Wt 150 lb (68 kg)   LMP 05/25/2025 (Exact Date)   SpO2 99%   BMI 26.57 kg/m²  Estimated body mass index is 26.57 kg/m² as calculated from the following:    Height as of this encounter: 5' 3\" (1.6 m).    Weight as of this encounter: 150 lb (68 kg).  Results  LABS  Vitamin B12: 217     Physical Exam  GENERAL: Alert, cooperative, well developed, no acute distress.  HEENT: Normocephalic, normal oropharynx, moist mucous membranes. Normal tm b/l.   CHEST: Clear to auscultation bilaterally, no wheezes, rhonchi, or crackles.  CARDIOVASCULAR: Normal heart rate and rhythm, S1 and S2 normal without murmurs.  ABDOMEN: Soft, non-tender, non-distended, without organomegaly, normal bowel sounds.  EXTREMITIES: No cyanosis or edema.  NEUROLOGICAL: Cranial nerves grossly intact, moves all extremities without gross motor or sensory deficit, upper and lower extremity strength 5/5, reflexes normal, sensation intact.kellie hallpike negative/normal       Assessment & Plan:   There are no diagnoses linked to this encounter.  Assessment & Plan  Migraine  Suspected migraines due to unilateral headaches increasing in frequency and severity.  - Recommend NSAIDs or acetaminophen for headache management.  - Consider sumatriptan if over-the-counter medications are insufficient.    Benign Paroxysmal Positional Vertigo (BPPV)  Suspected BPPV due to episodic dizziness described as room spinning, worsened by motion.  - Prescribe meclizine 25 mg up to three times a day as needed for dizziness.  - Consider MRI of the brain to rule out other causes of dizziness.    Dermatographism  Suspected dermatographism with rashes appearing after scratching.  - Refer to an allergist for further evaluation and allergy testing.  - Recommend cetirizine or fexofenadine as needed for rash management.    Low Vitamin B12  Low-normal B12 level of 217, improving B12 may help with fatigue.  - Administer B12 injections once a month  for three months.    General Health Maintenance  Currently taking vitamin D and iron supplements.  - Order repeat CBC and ferritin blood work to assess iron levels.  - Continue vitamin D supplementation after completing the prescription.        No follow-ups on file.        Dewey Vang DO, 6/10/2025, 2:23 PM           The following individual(s) verbally consented to be recorded using ambient AI listening technology and understand that they can each withdraw their consent to this listening technology at any point by asking the clinician to turn off or pause the recording:    Patient name: Abisai Leggett         [1]   Current Outpatient Medications   Medication Sig Dispense Refill    nitrofurantoin monohydrate macro 100 MG Oral Cap Take 1 capsule (100 mg total) by mouth 2 (two) times daily.      ergocalciferol 1.25 MG (95289 UT) Oral Cap Take 1 capsule (50,000 Units total) by mouth once a week. 12 capsule 0    Ferrous Sulfate 325 (65 Fe) MG Oral Tab Take 1 tablet (325 mg total) by mouth daily with breakfast. 60 tablet 0    Orlistat 120 MG Oral Cap Take 1 capsule (120 mg total) by mouth in the morning. Rx'd outside [place] .      Inositol-D Chiro-Inositol (OVASITOL OR) Take 68 kg by mouth in the morning. Rx'd outside [place] .      metFORMIN  MG Oral Tablet 24 Hr Take 1 tablet (750 mg total) by mouth daily with breakfast. 90 tablet 2    Norethin Ace-Eth Estrad-FE 1-20 MG-MCG Oral Tab Take 1 tablet by mouth daily. 28 tablet 12

## 2025-06-11 NOTE — PATIENT INSTRUCTIONS
VISIT SUMMARY:  During your visit, we discussed your recent symptoms of dizziness and headaches, as well as a new skin reaction. We reviewed your current supplements and discussed potential causes and treatments for your symptoms.    YOUR PLAN:  -MIGRAINE: Migraines are severe headaches often affecting one side of the head, sometimes accompanied by other symptoms like dizziness. We recommend using NSAIDs or acetaminophen for pain relief, and if these are not effective, we may consider prescribing sumatriptan.    -BENIGN PAROXYSMAL POSITIONAL VERTIGO (BPPV): BPPV is a condition that causes brief episodes of dizziness when you move your head in certain ways. We have prescribed meclizine 25 mg, which you can take up to three times a day as needed for dizziness. We may also consider an MRI to rule out other causes of your dizziness.    -DERMATOGRAPHISM: Dermatographism is a skin condition where red, itchy spots appear after scratching. We recommend seeing an allergist for further evaluation and allergy testing. In the meantime, you can use cetirizine or fexofenadine to manage the rash.    -LOW VITAMIN B12: Low vitamin B12 levels can cause fatigue and other symptoms. We will administer B12 injections once a month for three months to help improve your levels.    -GENERAL HEALTH MAINTENANCE: You are currently taking vitamin D and iron supplements. We will order repeat blood work to check your iron levels and recommend continuing vitamin D supplementation after you finish your current prescription.    INSTRUCTIONS:  Please follow up with an allergist for your skin reactions. We will also schedule an MRI to further investigate your dizziness. Continue taking your current supplements and start the prescribed medications as discussed. We will see you again in a month to review your progress and make any necessary adjustments.    Contains text generated by Estella

## 2025-06-12 LAB
A ALTERNATA IGE QN: <0.1 KUA/L (ref ?–0.1)
A FUMIGATUS IGE QN: 0.49 KUA/L (ref ?–0.1)
AMER SYCAMORE IGE QN: <0.1 KUA/L (ref ?–0.1)
BERMUDA GRASS IGE QN: <0.1 KUA/L (ref ?–0.1)
BOXELDER IGE QN: <0.1 KUA/L (ref ?–0.1)
C HERBARUM IGE QN: <0.1 KUA/L (ref ?–0.1)
CALIF WALNUT IGE QN: <0.1 KUA/L (ref ?–0.1)
CAT DANDER IGE QN: <0.1 KUA/L (ref ?–0.1)
CMN PIGWEED IGE QN: <0.1 KUA/L (ref ?–0.1)
COMMON RAGWEED IGE QN: <0.1 KUA/L (ref ?–0.1)
COTTONWOOD IGE QN: <0.1 KUA/L (ref ?–0.1)
D FARINAE IGE QN: <0.1 KUA/L (ref ?–0.1)
D PTERONYSS IGE QN: <0.1 KUA/L (ref ?–0.1)
DOG DANDER IGE QN: <0.1 KUA/L (ref ?–0.1)
IGE SERPL-ACNC: 136 KU/L (ref 2–214)
M RACEMOSUS IGE QN: <0.1 KUA/L (ref ?–0.1)
MARSH ELDER IGE QN: <0.1 KUA/L (ref ?–0.1)
MOUSE EPITH IGE QN: <0.1 KUA/L (ref ?–0.1)
MT JUNIPER IGE QN: <0.1 KUA/L (ref ?–0.1)
P NOTATUM IGE QN: <0.1 KUA/L (ref ?–0.1)
PECAN/HICK TREE IGE QN: <0.1 KUA/L (ref ?–0.1)
ROACH IGE QN: <0.1 KUA/L (ref ?–0.1)
SALTWORT IGE QN: <0.1 KUA/L (ref ?–0.1)
SILVER BIRCH IGE QN: <0.1 KUA/L (ref ?–0.1)
TIMOTHY IGE QN: <0.1 KUA/L (ref ?–0.1)
WHITE ASH IGE QN: <0.1 KUA/L (ref ?–0.1)
WHITE ELM IGE QN: <0.1 KUA/L (ref ?–0.1)
WHITE MULBERRY IGE QN: <0.1 KUA/L (ref ?–0.1)
WHITE OAK IGE QN: <0.1 KUA/L (ref ?–0.1)

## 2025-07-01 ENCOUNTER — HOSPITAL ENCOUNTER (OUTPATIENT)
Dept: MRI IMAGING | Age: 30
Discharge: HOME | End: 2025-07-01
Attending: FAMILY MEDICINE
Payer: COMMERCIAL

## 2025-07-01 DIAGNOSIS — G43.719 INTRACTABLE CHRONIC MIGRAINE WITHOUT AURA AND WITHOUT STATUS MIGRAINOSUS: ICD-10-CM

## 2025-07-01 PROCEDURE — 70551 MRI BRAIN STEM W/O DYE: CPT | Performed by: RADIOLOGY

## 2025-07-05 ENCOUNTER — PATIENT MESSAGE (OUTPATIENT)
Dept: FAMILY MEDICINE CLINIC | Facility: CLINIC | Age: 30
End: 2025-07-05

## 2025-07-07 RX ORDER — FERROUS SULFATE 325(65) MG
325 TABLET ORAL
Qty: 90 TABLET | Refills: 0 | Status: SHIPPED | OUTPATIENT
Start: 2025-07-07

## 2025-07-07 NOTE — TELEPHONE ENCOUNTER
Patient's response to MRI result note.            Patient requesting refill on ferrous sulfate.   LOV: 6/10/2025 for headache  RTC: prn    Labs:   Component  Ref Range & Units (hover) 6/10/25  3:01 PM   Ferritin 22 Low      The ferritin level which is iron storage is low, I recommend that you take over-the-counter iron supplement ferrous sulfate 325 mg daily for 6 weeks. After that take a multivitamin with 18 mg of elemental iron.     Last dispensed: 4/20/2025.    No future appointments.    Rx pended.  No protocol.  Routed to provider for review.

## 2025-07-25 DIAGNOSIS — R73.03 PREDIABETES: ICD-10-CM

## 2025-07-25 DIAGNOSIS — R73.9 HYPERGLYCEMIA: ICD-10-CM

## 2025-07-31 RX ORDER — METFORMIN HYDROCHLORIDE 750 MG/1
750 TABLET, EXTENDED RELEASE ORAL
Qty: 90 TABLET | Refills: 2 | OUTPATIENT
Start: 2025-07-31

## 2025-07-31 RX ORDER — FERROUS SULFATE 325(65) MG
325 TABLET ORAL
Qty: 90 TABLET | Refills: 0 | OUTPATIENT
Start: 2025-07-31

## 2025-08-01 DIAGNOSIS — E55.9 HYPOVITAMINOSIS D: ICD-10-CM

## 2025-08-05 RX ORDER — ERGOCALCIFEROL 1.25 MG/1
50000 CAPSULE, LIQUID FILLED ORAL WEEKLY
Qty: 12 CAPSULE | Refills: 0 | OUTPATIENT
Start: 2025-08-05

## (undated) NOTE — LETTER
[facility]  Yanet Clinton 61 [address] [facility], [address] [place] [address]    Consent for Operation    Date: __________________    Time: _______________    1.  I authorize the performance upon [facility] the following operation:                              Joao Funes videotape. The [facility] will not be responsible for storage or maintenance of this tape. 6. For the purpose of advancing medical education, I consent to the admittance of observers to the Operating Room.     7. I authorize the use of any specimen, organs Signature of Patient:   ___________________________    When the patient is a minor or mentally incompetent to give consent:  Signature of person authorized to consent for patient: ___________________________   Relationship to patient: _____________________ before. 3. I understand how the anesthesia medicine will help me (benefits). 4. I understand that with any type of anesthesia medicine there are risks:  a.  The most common risks are: nausea, vomiting, sore throat, muscle soreness, damage to my eyes, _____________________________________________________________________________  Witness        Date   Time  I have verified that the signature is that of the patient or patient’s representative, and that it was signed before the procedure    Page 2 of 2

## (undated) NOTE — LETTER
[facility]  Yanet Clinton 61 [address] [facility], [address] [place] [address]    Consent for Operation    Date: __________________    Time: _______________    1.  I authorize the performance upon [facility] the following operation:                              {OB PROCEDURES videotape. The [facility] will not be responsible for storage or maintenance of this tape. 6. For the purpose of advancing medical education, I consent to the admittance of observers to the Operating Room.     7. I authorize the use of any specimen, organs Signature of Patient:   ___________________________    When the patient is a minor or mentally incompetent to give consent:  Signature of person authorized to consent for patient: ___________________________   Relationship to patient: _____________________ before. 3. I understand how the anesthesia medicine will help me (benefits). 4. I understand that with any type of anesthesia medicine there are risks:  a.  The most common risks are: nausea, vomiting, sore throat, muscle soreness, damage to my eyes, _____________________________________________________________________________  Witness        Date   Time  I have verified that the signature is that of the patient or patient’s representative, and that it was signed before the procedure    Page 2 of 2

## (undated) NOTE — Clinical Note
Pt was contacted for TCM. TE was sent to FU on HFU appt as pt declined for now. Pt reports she is doing better since d/c. Pt denies concerns with the midline. Medication list reviewed. Assistance was provided to schedule with ID. Thank you.

## (undated) NOTE — LETTER
[address] [facility]     I agree to have a Peripherally Inserted Central Catheter (PICC) placed in my arm.    1. The PICC insertion procedure, care, maintenance, risks, benefits, and complications have been explained to me by my physic benefits, and side effects related to the alternatives and risks related to not receiving this procedure. 8.  I have expressed any questions about this procedure to my physician or the PICC Proceduralist and he/she has answered them.   I certify that I h

## (undated) NOTE — MR AVS SNAPSHOT
After Visit Summary   2017    Antonia Ortega    MRN: [id number]           Visit Information        Provider Department Dept Phone    2017  3:00 PM [id number]  Outpt [phone]      Your Vitals Were     BP Pulse Wt LMP          136/6 Fish: [phone]  [address]: [phone]    Important note regarding exams that require a referral/authorization: As a service to you, the [facility] will obtain any necessary prior authorizatio Next. Rashaad Jackson will be taken to the next sign-up page. Create a [employer] it Username. This will be your [employer] login Username and cannot be changed, so think of one that is secure and easy to remember. Create a [employer] password.  You can change your password

## (undated) NOTE — Clinical Note
IMPRESSION: IUP at 24w4d Suboptimal views on outside ultrasound: Normal anatomic survey on level II today Prior  section  RECOMMENDATIONS: Continue care with Dr. Sonia Jimenez Routine OB care